# Patient Record
Sex: FEMALE | Race: NATIVE HAWAIIAN OR OTHER PACIFIC ISLANDER | NOT HISPANIC OR LATINO | Employment: FULL TIME | URBAN - METROPOLITAN AREA
[De-identification: names, ages, dates, MRNs, and addresses within clinical notes are randomized per-mention and may not be internally consistent; named-entity substitution may affect disease eponyms.]

---

## 2017-01-03 ENCOUNTER — OFFICE VISIT (OUTPATIENT)
Dept: NEUROLOGY | Facility: CLINIC | Age: 47
End: 2017-01-03

## 2017-01-03 VITALS
SYSTOLIC BLOOD PRESSURE: 150 MMHG | HEIGHT: 64 IN | WEIGHT: 146.4 LBS | DIASTOLIC BLOOD PRESSURE: 84 MMHG | BODY MASS INDEX: 25 KG/M2 | HEART RATE: 60 BPM

## 2017-03-02 DIAGNOSIS — E03.8 OTHER SPECIFIED HYPOTHYROIDISM: ICD-10-CM

## 2017-03-02 NOTE — TELEPHONE ENCOUNTER
Levothyroxine     Last Written Prescription Date: 08/03/16  Last Quantity: 30, # refills: 5  Last Office Visit with G, P or [facility] prescribing provider: 10/10/16        TSH   Date Value Ref Range Status   03/15/2016 0.44 0.40 - 4.00 mU/L Final

## 2017-03-07 RX ORDER — LEVOTHYROXINE SODIUM 100 UG/1
100 TABLET ORAL DAILY
Qty: 30 TABLET | Refills: 0 | Status: SHIPPED | OUTPATIENT
Start: 2017-03-07 | End: 2017-04-18

## 2017-03-07 NOTE — TELEPHONE ENCOUNTER
Medication is being filled for 1 time refill only due to:  Future labs ordered thyroid labs due.   Aida Candelario RN

## 2017-04-06 DIAGNOSIS — E03.8 OTHER SPECIFIED HYPOTHYROIDISM: ICD-10-CM

## 2017-04-06 NOTE — TELEPHONE ENCOUNTER
levothyroxine     Last Written Prescription Date: 3/7/17  Last Quantity: 30, # refills: 0  Last Office Visit with G, P or [facility] prescribing provider: 10/10/16        TSH   Date Value Ref Range Status   03/15/2016 0.44 0.40 - 4.00 mU/L Final

## 2017-04-18 ENCOUNTER — MYC MEDICAL ADVICE (OUTPATIENT)
Dept: INTERNAL MEDICINE | Facility: CLINIC | Age: 47
End: 2017-04-18

## 2017-04-18 ENCOUNTER — MYC MEDICAL ADVICE (OUTPATIENT)
Dept: ENDOCRINOLOGY | Facility: CLINIC | Age: 47
End: 2017-04-18

## 2017-04-18 DIAGNOSIS — E03.8 OTHER SPECIFIED HYPOTHYROIDISM: ICD-10-CM

## 2017-04-18 RX ORDER — LEVOTHYROXINE SODIUM 100 UG/1
TABLET ORAL
Qty: 30 TABLET | Refills: 0 | OUTPATIENT
Start: 2017-04-18

## 2017-04-18 RX ORDER — LEVOTHYROXINE SODIUM 100 UG/1
100 TABLET ORAL DAILY
Qty: 30 TABLET | Refills: 0 | Status: SHIPPED | OUTPATIENT
Start: 2017-04-18 | End: 2017-05-20

## 2017-04-18 NOTE — TELEPHONE ENCOUNTER
Please let the patient know that I was on maternity leave until April 17, so did not receive the message.    The refill may not have went through since her TSH test was overdue.

## 2017-04-18 NOTE — TELEPHONE ENCOUNTER
Pt calls, she has scheduled an appt.   Next 5 appointments (look out 90 days)     May 17, 2017  7:20 AM CDT   [facility]    [address]  [facility] [id number] [phone]                Medication is being filled for 1 time refill only due to:  Refilled through upcoming office visit.

## 2017-05-17 ENCOUNTER — OFFICE VISIT (OUTPATIENT)
Dept: INTERNAL MEDICINE | Facility: CLINIC | Age: 47
End: 2017-05-17
Payer: COMMERCIAL

## 2017-05-17 ENCOUNTER — HOSPITAL ENCOUNTER (OUTPATIENT)
Dept: MAMMOGRAPHY | Facility: CLINIC | Age: 47
Discharge: HOME | End: 2017-05-17
Attending: INTERNAL MEDICINE | Admitting: INTERNAL MEDICINE
Payer: COMMERCIAL

## 2017-05-17 VITALS
OXYGEN SATURATION: 100 % | SYSTOLIC BLOOD PRESSURE: 92 MMHG | WEIGHT: 137.4 LBS | TEMPERATURE: 97.7 F | BODY MASS INDEX: 23.46 KG/M2 | DIASTOLIC BLOOD PRESSURE: 68 MMHG | HEART RATE: 52 BPM | HEIGHT: 64 IN

## 2017-05-17 DIAGNOSIS — Z12.31 VISIT FOR SCREENING MAMMOGRAM: ICD-10-CM

## 2017-05-17 DIAGNOSIS — E03.8 OTHER SPECIFIED HYPOTHYROIDISM: Primary | ICD-10-CM

## 2017-05-17 LAB
T3 SERPL-MCNC: 67 NG/DL (ref 60–181)
T4 FREE SERPL-MCNC: 1.3 NG/DL (ref 0.76–1.46)
TSH SERPL DL<=0.05 MIU/L-ACNC: 0.46 MU/L (ref 0.4–4)

## 2017-05-17 PROCEDURE — 99213 OFFICE O/P EST LOW 20 MIN: CPT | Performed by: INTERNAL MEDICINE

## 2017-05-17 PROCEDURE — 36415 COLL VENOUS BLD VENIPUNCTURE: CPT | Performed by: INTERNAL MEDICINE

## 2017-05-17 PROCEDURE — G0202 SCR MAMMO BI INCL CAD: HCPCS

## 2017-05-17 PROCEDURE — 84439 ASSAY OF FREE THYROXINE: CPT | Performed by: INTERNAL MEDICINE

## 2017-05-17 PROCEDURE — 84443 ASSAY THYROID STIM HORMONE: CPT | Performed by: INTERNAL MEDICINE

## 2017-05-17 PROCEDURE — 84480 ASSAY TRIIODOTHYRONINE (T3): CPT | Performed by: INTERNAL MEDICINE

## 2017-05-17 NOTE — PROGRESS NOTES
"  SUBJECTIVE:                                                    Gale Willett is a 46 year old female who presents to clinic today for the following health issues:    Hypothyroidism Follow-up      Since last visit, patient describes the following symptoms: Weight stable, no hair loss, no skin changes, no constipation, no loose stools       Amount of exercise or physical activity: 2-3 days/week for an average of 30-60 Minutes    Problems taking medications regularly: No    Medication side effects: none    Diet: regular (no restrictions)      Problem list and histories reviewed & adjusted, as indicated.      Reviewed and updated as needed this visit by clinical staff       Reviewed and updated as needed this visit by Provider         ROS:  C: NEGATIVE for fever, chills, change in weight  E/M: NEGATIVE for ear, mouth and throat problems  R: NEGATIVE for significant cough or SOB  CV: NEGATIVE for chest pain, palpitations or peripheral edema    OBJECTIVE:                                                    BP 92/68 (BP Location: Left arm, Patient Position: Chair, Cuff Size: Adult Regular)  Pulse 52  Temp 97.7  F (36.5  C) (Oral)  Ht 5' 4\" (1.626 m)  Wt 137 lb 6.4 oz (62.3 kg)  LMP 05/14/2017  SpO2 100%  BMI 23.58 kg/m2  Body mass index is 23.58 kg/(m^2).  GENERAL: healthy, alert and no distress  NECK: no adenopathy, no asymmetry, masses, or scars and thyroid normal to palpation  RESP: lungs clear to auscultation - no rales, rhonchi or wheezes  CV: regular rate and rhythm, normal S1 S2, no S3 or S4, no murmur, click or rub     ASSESSMENT/PLAN:                                                        (E03.8) Other specified hypothyroidism  (primary encounter diagnosis)  Comment: reassess  Plan: TSH, T4 free, T3 total            (Z12.31) Visit for screening mammogram  Comment:   Plan: MA Screen with Implants Bilateral w/Carlos,                  Kassi Garcia MD  [facility]    "

## 2017-05-17 NOTE — NURSING NOTE
"Chief Complaint   Patient presents with     Recheck Medication     Thyroid - Levothyroxine       Initial BP 92/68 (BP Location: Left arm, Patient Position: Chair, Cuff Size: Adult Regular)  Pulse 52  Temp 97.7  F (36.5  C) (Oral)  Ht 5' 4\" (1.626 m)  Wt 137 lb 6.4 oz (62.3 kg)  LMP 05/14/2017  SpO2 100%  BMI 23.58 kg/m2 Estimated body mass index is 23.58 kg/(m^2) as calculated from the following:    Height as of this encounter: 5' 4\" (1.626 m).    Weight as of this encounter: 137 lb 6.4 oz (62.3 kg).  Medication Reconciliation: complete   Heena Mariee MA    "

## 2017-05-17 NOTE — MR AVS SNAPSHOT
"              After Visit Summary   5/17/2017    Gale Willett    MRN: [id number]           Patient Information     Date Of Birth          1970        Visit Information        Provider Department      5/17/2017 7:20 AM Kassi Garcia MD [facility]        Today's Diagnoses     Other specified hypothyroidism    -  1       Follow-ups after your visit        Who to contact     If you have questions or need follow up information about today's clinic visit or your schedule please contact [facility] directly at [phone].  Normal or non-critical lab and imaging results will be communicated to you by MyChart, letter or phone within 4 business days after the clinic has received the results. If you do not hear from us within 7 days, please contact the clinic through [facility] or phone. If you have a critical or abnormal lab result, we will notify you by phone as soon as possible.  Submit refill requests through [employer] or call your pharmacy and they will forward the refill request to us. Please allow 3 business days for your refill to be completed.          Additional Information About Your Visit        MyChart Information     [employer] gives you secure access to your electronic health record. If you see a primary care provider, you can also send messages to your care team and make appointments. If you have questions, please call your primary care clinic.  If you do not have a primary care provider, please call [phone] and they will assist you.        Care EveryWhere ID     This is your Care EveryWhere ID. This could be used by other organizations to access your [facility] medical records  [id number]        Your Vitals Were     Pulse Temperature Height Last Period Pulse Oximetry BMI (Body Mass Index)    52 97.7  F (36.5  C) (Oral) 5' 4\" (1.626 m) 05/14/2017 100% 23.58 kg/m2       Blood Pressure from Last 3 Encounters:   05/17/17 92/68   01/03/17 150/84   10/10/16 90/60    " Weight from Last 3 Encounters:   05/17/17 137 lb 6.4 oz (62.3 kg)   01/03/17 146 lb 6.4 oz (66.4 kg)   10/10/16 139 lb 11.2 oz (63.4 kg)              We Performed the Following     T3 total     T4 free     TSH        Primary Care Provider Office Phone # Fax #    Kassi Garcia [phone]       [facility] [address] [facility] [address]        Thank you!     Thank you for choosing [facility]  for your care. Our goal is always to provide you with excellent care. Hearing back from our patients is one way we can continue to improve our services. Please take a few minutes to complete the written survey that you may receive in the mail after your visit with us. Thank you!             Your Updated Medication List - Protect others around you: Learn how to safely use, store and throw away your medicines at www.disposemymeds.org.          This list is accurate as of: 5/17/17  8:06 AM.  Always use your most recent med list.                   Brand Name Dispense Instructions for use    levothyroxine 100 MCG tablet    SYNTHROID/LEVOTHROID    30 tablet    Take 1 tablet (100 mcg) by mouth daily

## 2017-05-20 DIAGNOSIS — E03.8 OTHER SPECIFIED HYPOTHYROIDISM: ICD-10-CM

## 2017-05-22 RX ORDER — LEVOTHYROXINE SODIUM 100 UG/1
TABLET ORAL
Qty: 90 TABLET | Refills: 1 | Status: SHIPPED | OUTPATIENT
Start: 2017-05-22 | End: 2017-11-08

## 2017-05-22 NOTE — TELEPHONE ENCOUNTER
Levothyroxine     Last Written Prescription Date: 4/18/17  Last Quantity: 30, # refills: 0  Last Office Visit with G, P or [facility] prescribing provider: 5/17/17        TSH   Date Value Ref Range Status   05/17/2017 0.46 0.40 - 4.00 mU/L Final

## 2017-11-08 DIAGNOSIS — E03.8 OTHER SPECIFIED HYPOTHYROIDISM: ICD-10-CM

## 2017-11-08 RX ORDER — LEVOTHYROXINE SODIUM 100 UG/1
TABLET ORAL
Qty: 90 TABLET | Refills: 1 | Status: SHIPPED | OUTPATIENT
Start: 2017-11-08 | End: 2018-05-02

## 2018-05-02 DIAGNOSIS — E03.8 OTHER SPECIFIED HYPOTHYROIDISM: ICD-10-CM

## 2018-05-02 RX ORDER — LEVOTHYROXINE SODIUM 100 UG/1
TABLET ORAL
Qty: 90 TABLET | Refills: 0 | Status: SHIPPED | OUTPATIENT
Start: 2018-05-02 | End: 2018-07-16

## 2018-05-02 NOTE — TELEPHONE ENCOUNTER
"          Requested Prescriptions   Pending Prescriptions Disp Refills     levothyroxine (SYNTHROID/LEVOTHROID) 100 MCG tablet [Pharmacy Med Name: LEVOTHYROXINE 100 MCG TABLET] 90 tablet 1     Sig: TAKE 1 TABLET BY MOUTH DAILY    Thyroid Protocol Passed    5/2/2018  1:40 AM       Passed - Patient is 12 years or older       Passed - Recent (12 mo) or future (30 days) visit within the authorizing provider's specialty    Patient had office visit in the last 12 months or has a visit in the next 30 days with authorizing provider or within the authorizing provider's specialty.  See \"Patient Info\" tab in inbasket, or \"Choose Columns\" in Meds & Orders section of the refill encounter.           Passed - Normal TSH on file in past 12 months    Recent Labs   Lab Test  05/17/17   0837   TSH  0.46             Passed - No active pregnancy on record    If patient is pregnant or has had a positive pregnancy test, please check TSH.         Passed - No positive pregnancy test in past 12 months    If patient is pregnant or has had a positive pregnancy test, please check TSH.            "

## 2018-07-16 ENCOUNTER — MYC REFILL (OUTPATIENT)
Dept: INTERNAL MEDICINE | Facility: CLINIC | Age: 48
End: 2018-07-16

## 2018-07-16 DIAGNOSIS — E03.8 OTHER SPECIFIED HYPOTHYROIDISM: ICD-10-CM

## 2018-07-17 RX ORDER — LEVOTHYROXINE SODIUM 100 UG/1
100 TABLET ORAL DAILY
Qty: 90 TABLET | Refills: 0 | Status: SHIPPED | OUTPATIENT
Start: 2018-07-17 | End: 2018-10-04

## 2018-07-17 NOTE — TELEPHONE ENCOUNTER
Message from Nivela:  Original authorizing provider: MD Anthony Macariorhianna DOTSONMireya Willett would like a refill of the following medications:  levothyroxine (SYNTHROID/LEVOTHROID) 100 MCG tablet [Kassi Garcia MD]    Preferred pharmacy: [address] [address] [facility]    Comment:  I sent Dr. Garcia a message. I didn't run out, but I left behind my prescription when I was traveling for work. Can you please refill today? I haven't taken my medication since 7/13. Thank you.

## 2018-07-17 NOTE — TELEPHONE ENCOUNTER
"Patient left Levothyroxine bottle at hotel and has been out for about 5 days. Requesting refill.     Requested Prescriptions   Pending Prescriptions Disp Refills     levothyroxine (SYNTHROID/LEVOTHROID) 100 MCG tablet  Last Written Prescription Date:  5/2/18  Last Fill Quantity: 90,  # refills: 0   Last office visit: 5/17/2017 with prescribing provider:  Jose   Future Office Visit:    90 tablet 0     Sig: Take 1 tablet (100 mcg) by mouth daily    Thyroid Protocol Failed    7/17/2018  3:46 PM       Failed - Recent (12 mo) or future (30 days) visit within the authorizing provider's specialty    Patient had office visit in the last 12 months or has a visit in the next 30 days with authorizing provider or within the authorizing provider's specialty.  See \"Patient Info\" tab in inbasket, or \"Choose Columns\" in Meds & Orders section of the refill encounter.           Failed - Normal TSH on file in past 12 months    Recent Labs   Lab Test  05/17/17   0837   TSH  0.46             Passed - Patient is 12 years or older       Passed - No active pregnancy on record    If patient is pregnant or has had a positive pregnancy test, please check TSH.         Passed - No positive pregnancy test in past 12 months    If patient is pregnant or has had a positive pregnancy test, please check TSH.        Medication is being filled for 1 time refill only due to:  Patient needs to be seen because it has been more than one year since last visit.   "

## 2018-08-01 DIAGNOSIS — E03.8 OTHER SPECIFIED HYPOTHYROIDISM: ICD-10-CM

## 2018-08-01 NOTE — TELEPHONE ENCOUNTER
"Requested Prescriptions   Pending Prescriptions Disp Refills     levothyroxine (SYNTHROID/LEVOTHROID) 100 MCG tablet [Pharmacy Med Name: LEVOTHYROXINE 100 MCG TABLET] 90 tablet 0    Last Written Prescription Date:  07/17/2018  Last Fill Quantity: 90,  # refills: 0   Last office visit: 5/17/2017 with prescribing provider:     Future Office Visit:   Sig: TAKE 1 TABLET BY MOUTH EVERY DAY    Thyroid Protocol Failed    8/1/2018  1:50 AM       Failed - Recent (12 mo) or future (30 days) visit within the authorizing provider's specialty    Patient had office visit in the last 12 months or has a visit in the next 30 days with authorizing provider or within the authorizing provider's specialty.  See \"Patient Info\" tab in inbasket, or \"Choose Columns\" in Meds & Orders section of the refill encounter.           Failed - Normal TSH on file in past 12 months    Recent Labs   Lab Test  05/17/17   0837   TSH  0.46             Passed - Patient is 12 years or older       Passed - No active pregnancy on record    If patient is pregnant or has had a positive pregnancy test, please check TSH.         Passed - No positive pregnancy test in past 12 months    If patient is pregnant or has had a positive pregnancy test, please check TSH.          "

## 2018-08-03 NOTE — TELEPHONE ENCOUNTER
Last office visit 5-17-17    Patient is over-due for appointment.  [facility] message sent 7-17-18 to inform her.  No future appointment scheduled.    Will refill x 1 month after appointment scheduled.    Attempted to speak with Patient but no answer and not able to leave message d/t mailbox full.    Will try again later.

## 2018-08-09 RX ORDER — LEVOTHYROXINE SODIUM 100 UG/1
TABLET ORAL
Qty: 30 TABLET | Refills: 0 | OUTPATIENT
Start: 2018-08-09

## 2018-08-09 NOTE — TELEPHONE ENCOUNTER
Patient received refill 5/2/18 for 90 tabs and 7/17/18 for 90 tabs as she was out of town and left her medications there. Due for office visit and labs. Was sent [employer] on 7/16 and patient did read it. No appointment scheduled. Patient should not be out of medication yet. Denied.

## 2018-08-10 ENCOUNTER — TELEPHONE (OUTPATIENT)
Dept: INTERNAL MEDICINE | Facility: CLINIC | Age: 48
End: 2018-08-10

## 2018-08-10 NOTE — TELEPHONE ENCOUNTER
Received refill request from [facility] for Levothyroxine - denial was sent on 8/9/18 as this is too soon to refill. Contacted the pharmacy, they show patient picked up prescription sent on 7/17/18 for 90 day supply.

## 2018-10-04 ENCOUNTER — TELEPHONE (OUTPATIENT)
Dept: INTERNAL MEDICINE | Facility: CLINIC | Age: 48
End: 2018-10-04

## 2018-10-04 DIAGNOSIS — E03.8 OTHER SPECIFIED HYPOTHYROIDISM: ICD-10-CM

## 2018-10-05 NOTE — TELEPHONE ENCOUNTER
"Requested Prescriptions   Pending Prescriptions Disp Refills     levothyroxine (SYNTHROID/LEVOTHROID) 100 MCG tablet [Pharmacy Med Name: LEVOTHYROXINE 0.100MG (100MCG) TAB] 90 tablet 0    Last Written Prescription Date:  07/17/2018  Last Fill Quantity: 90,  # refills: 0   Last office visit: 5/17/2017 with prescribing provider:     Future Office Visit:   Sig: TAKE 1 TABLET BY MOUTH EVERY DAY    Thyroid Protocol Failed    10/5/2018  8:39 AM       Failed - Recent (12 mo) or future (30 days) visit within the authorizing provider's specialty    Patient had office visit in the last 12 months or has a visit in the next 30 days with authorizing provider or within the authorizing provider's specialty.  See \"Patient Info\" tab in inbasket, or \"Choose Columns\" in Meds & Orders section of the refill encounter.           Failed - Normal TSH on file in past 12 months    Recent Labs   Lab Test  05/17/17   0837   TSH  0.46             Passed - Patient is 12 years or older       Passed - No active pregnancy on record    If patient is pregnant or has had a positive pregnancy test, please check TSH.         Passed - No positive pregnancy test in past 12 months    If patient is pregnant or has had a positive pregnancy test, please check TSH.          "

## 2018-10-08 RX ORDER — LEVOTHYROXINE SODIUM 100 UG/1
TABLET ORAL
Qty: 90 TABLET | Refills: 0 | Status: SHIPPED | OUTPATIENT
Start: 2018-10-08 | End: 2018-10-22

## 2018-10-22 ENCOUNTER — OFFICE VISIT (OUTPATIENT)
Dept: INTERNAL MEDICINE | Facility: CLINIC | Age: 48
End: 2018-10-22
Payer: COMMERCIAL

## 2018-10-22 VITALS
DIASTOLIC BLOOD PRESSURE: 72 MMHG | SYSTOLIC BLOOD PRESSURE: 112 MMHG | BODY MASS INDEX: 24.07 KG/M2 | OXYGEN SATURATION: 100 % | HEIGHT: 64 IN | WEIGHT: 141 LBS | TEMPERATURE: 98.2 F | HEART RATE: 58 BPM

## 2018-10-22 DIAGNOSIS — Z12.31 VISIT FOR SCREENING MAMMOGRAM: ICD-10-CM

## 2018-10-22 DIAGNOSIS — Z00.00 ENCOUNTER FOR ROUTINE ADULT HEALTH EXAMINATION WITHOUT ABNORMAL FINDINGS: Primary | ICD-10-CM

## 2018-10-22 DIAGNOSIS — Z98.82 H/O BREAST IMPLANT: ICD-10-CM

## 2018-10-22 DIAGNOSIS — E03.8 OTHER SPECIFIED HYPOTHYROIDISM: ICD-10-CM

## 2018-10-22 DIAGNOSIS — N64.4 BREAST PAIN, LEFT: ICD-10-CM

## 2018-10-22 LAB
BASOPHILS # BLD AUTO: 0 10E9/L (ref 0–0.2)
BASOPHILS NFR BLD AUTO: 0 %
DIFFERENTIAL METHOD BLD: ABNORMAL
EOSINOPHIL # BLD AUTO: 0.2 10E9/L (ref 0–0.7)
EOSINOPHIL NFR BLD AUTO: 5.3 %
ERYTHROCYTE [DISTWIDTH] IN BLOOD BY AUTOMATED COUNT: 12.9 % (ref 10–15)
HCT VFR BLD AUTO: 39.6 % (ref 35–47)
HGB BLD-MCNC: 13.2 G/DL (ref 11.7–15.7)
LYMPHOCYTES # BLD AUTO: 1.1 10E9/L (ref 0.8–5.3)
LYMPHOCYTES NFR BLD AUTO: 35 %
MCH RBC QN AUTO: 31.3 PG (ref 26.5–33)
MCHC RBC AUTO-ENTMCNC: 33.3 G/DL (ref 31.5–36.5)
MCV RBC AUTO: 94 FL (ref 78–100)
MONOCYTES # BLD AUTO: 0.3 10E9/L (ref 0–1.3)
MONOCYTES NFR BLD AUTO: 10.2 %
NEUTROPHILS # BLD AUTO: 1.6 10E9/L (ref 1.6–8.3)
NEUTROPHILS NFR BLD AUTO: 49.5 %
PLATELET # BLD AUTO: 175 10E9/L (ref 150–450)
RBC # BLD AUTO: 4.22 10E12/L (ref 3.8–5.2)
T3 SERPL-MCNC: 82 NG/DL (ref 60–181)
WBC # BLD AUTO: 3.2 10E9/L (ref 4–11)

## 2018-10-22 PROCEDURE — 84439 ASSAY OF FREE THYROXINE: CPT | Performed by: INTERNAL MEDICINE

## 2018-10-22 PROCEDURE — 36415 COLL VENOUS BLD VENIPUNCTURE: CPT | Performed by: INTERNAL MEDICINE

## 2018-10-22 PROCEDURE — 80053 COMPREHEN METABOLIC PANEL: CPT | Performed by: INTERNAL MEDICINE

## 2018-10-22 PROCEDURE — 85025 COMPLETE CBC W/AUTO DIFF WBC: CPT | Performed by: INTERNAL MEDICINE

## 2018-10-22 PROCEDURE — 84443 ASSAY THYROID STIM HORMONE: CPT | Performed by: INTERNAL MEDICINE

## 2018-10-22 PROCEDURE — 84480 ASSAY TRIIODOTHYRONINE (T3): CPT | Performed by: INTERNAL MEDICINE

## 2018-10-22 PROCEDURE — 99396 PREV VISIT EST AGE 40-64: CPT | Performed by: INTERNAL MEDICINE

## 2018-10-22 RX ORDER — LEVOTHYROXINE SODIUM 100 UG/1
100 TABLET ORAL DAILY
Qty: 90 TABLET | Refills: 4 | Status: SHIPPED | OUTPATIENT
Start: 2018-10-22 | End: 2020-02-11

## 2018-10-22 ASSESSMENT — ENCOUNTER SYMPTOMS
HEADACHES: 0
FEVER: 0
PARESTHESIAS: 0
HEMATURIA: 0
ARTHRALGIAS: 0
DIZZINESS: 0
DIARRHEA: 0
HEMATOCHEZIA: 0
ABDOMINAL PAIN: 0
BREAST MASS: 0
EYE PAIN: 0
MYALGIAS: 0
SHORTNESS OF BREATH: 0
SORE THROAT: 0
COUGH: 0
JOINT SWELLING: 0
DYSURIA: 0
PALPITATIONS: 0
NAUSEA: 0
WEAKNESS: 0
CHILLS: 0
HEARTBURN: 0
NERVOUS/ANXIOUS: 0
CONSTIPATION: 0
FREQUENCY: 0

## 2018-10-22 NOTE — MR AVS SNAPSHOT
After Visit Summary   10/22/2018    Gale Willett    MRN: [id number]           Patient Information     Date Of Birth          1970        Visit Information        Provider Department      10/22/2018 8:20 AM Kassi Garcia MD [facility]        Today's Diagnoses     Encounter for routine adult health examination without abnormal findings    -  1    Other specified hypothyroidism        Visit for screening mammogram        H/O breast implant        Breast pain, left          Care Instructions      Preventive Health Recommendations  Female Ages 40 to 49    Yearly exam:     See your health care provider every year in order to  1. Review health changes.   2. Discuss preventive care.    3. Review your medicines if your doctor prescribed any.      Get a Pap test every three years (unless you have an abnormal result and your provider advises testing more often).      If you get Pap tests with HPV test, you only need to test every 5 years, unless you have an abnormal result. You do not need a Pap test if your uterus was removed (hysterectomy) and you have not had cancer.      You should be tested each year for STDs (sexually transmitted diseases), if you're at risk.     Ask your doctor if you should have a mammogram.      Have a colonoscopy (test for colon cancer) if someone in your family has had colon cancer or polyps before age 50.       Have a cholesterol test every 5 years.       Have a diabetes test (fasting glucose) after age 45. If you are at risk for diabetes, you should have this test every 3 years.    Shots: Get a flu shot each year. Get a tetanus shot every 10 years.     Nutrition:     Eat at least 5 servings of fruits and vegetables each day.    Eat whole-grain bread, whole-wheat pasta and brown rice instead of white grains and rice.    Get adequate Calcium and Vitamin D.      Lifestyle    Exercise at least 150 minutes a week (an average of 30 minutes a day, 5 days a  week). This will help you control your weight and prevent disease.    Limit alcohol to one drink per day.    No smoking.     Wear sunscreen to prevent skin cancer.    See your dentist every six months for an exam and cleaning.          Follow-ups after your visit        Additional Services     PLASTIC SURGERY REFERRAL       Your provider has referred you to: [facility] [place] [phone]   [email or web address]    Please be aware that coverage of these services is subject to the terms and limitations of your health insurance plan.  Call member services at your health plan with any benefit or coverage questions.      Please bring the following with you to your appointment:    (1) Any X-Rays, CTs or MRIs which have been performed.  Contact the facility where they were done to arrange for  prior to your scheduled appointment.    (2) List of current medications  (3) This referral request   (4) Any documents/labs given to you for this referral                  Future tests that were ordered for you today     Open Future Orders        Priority Expected Expires Ordered    US Breast Left Limited 1-3 Quadrants Routine  10/22/2019 10/22/2018    MA Diagnostic Digital Bilateral Routine  10/22/2019 10/22/2018            Who to contact     If you have questions or need follow up information about today's clinic visit or your schedule please contact [facility] directly at [phone].  Normal or non-critical lab and imaging results will be communicated to you by MyChart, letter or phone within 4 business days after the clinic has received the results. If you do not hear from us within 7 days, please contact the clinic through MyChart or phone. If you have a critical or abnormal lab result, we will notify you by phone as soon as possible.  Submit refill requests through [employer] or call your pharmacy and they will forward the refill request to us. Please allow 3 business days for  "your refill to be completed.          Additional Information About Your Visit        MyChart Information     [employer] gives you secure access to your electronic health record. If you see a primary care provider, you can also send messages to your care team and make appointments. If you have questions, please call your primary care clinic.  If you do not have a primary care provider, please call [phone] and they will assist you.        Care EveryWhere ID     This is your Care EveryWhere ID. This could be used by other organizations to access your [facility] medical records  [id number]        Your Vitals Were     Pulse Temperature Height Last Period Pulse Oximetry Breastfeeding?    58 98.2  F (36.8  C) (Oral) 5' 4\" (1.626 m) 10/20/2018 100% No    BMI (Body Mass Index)                   24.2 kg/m2            Blood Pressure from Last 3 Encounters:   10/22/18 112/72   05/17/17 92/68   01/03/17 150/84    Weight from Last 3 Encounters:   10/22/18 141 lb (64 kg)   05/17/17 137 lb 6.4 oz (62.3 kg)   01/03/17 146 lb 6.4 oz (66.4 kg)              We Performed the Following     CBC with platelets differential     Comprehensive metabolic panel     PLASTIC SURGERY REFERRAL     T3 total     T4 free     TSH        Primary Care Provider Office Phone # Fax #    Kassi Mauricio Garcia [phone]       [address] [facility] [address]        Equal Access to Services     [facility]: Hadii aad ku hadasho Soomaali, waaxda luqadaha, qaybta kaalmada adeangelicayada, waxay griffin [place] . So [facility] [phone].    ATENCIÓN: Si habla español, tiene a raphael disposición servicios gratuitos de asistencia lingüística. Llame al [phone].    We comply with applicable federal civil rights laws and [place] laws. We do not discriminate on the basis of race, color, national origin, age, disability, sex, sexual orientation, or gender identity.            Thank you!     Thank you for choosing [facility]  for your care. Our goal is always to provide you with excellent care. Hearing back from our patients is one way we can continue to improve our services. Please take a few minutes to complete the written survey that you may receive in the mail after your visit with us. Thank you!             Your Updated Medication List - Protect others around you: Learn how to safely use, store and throw away your medicines at www.disposemymeds.org.          This list is accurate as of 10/22/18  9:08 AM.  Always use your most recent med list.                   Brand Name Dispense Instructions for use Diagnosis    levothyroxine 100 MCG tablet    SYNTHROID/LEVOTHROID    90 tablet    TAKE 1 TABLET BY MOUTH EVERY DAY    Other specified hypothyroidism

## 2018-10-22 NOTE — NURSING NOTE
"/72  Pulse 58  Temp 98.2  F (36.8  C) (Oral)  Ht 5' 4\" (1.626 m)  Wt 141 lb (64 kg)  LMP 10/20/2018  SpO2 100%  Breastfeeding? No  BMI 24.2 kg/m2    "

## 2018-10-22 NOTE — PROGRESS NOTES
SUBJECTIVE:   CC: Gale Willett is an 48 year old woman who presents for preventive health visit.     Physical   Annual:     Getting at least 3 servings of Calcium per day:  Yes    Bi-annual eye exam:  Yes    Dental care twice a year:  Yes    Sleep apnea or symptoms of sleep apnea:  None    Diet:  Regular (no restrictions)    Frequency of exercise:  2-3 days/week    Duration of exercise:  45-60 minutes    Taking medications regularly:  Yes    Medication side effects:  None    Additional concerns today:  YES        Today's PHQ-2 Score:   PHQ-2 ( 1999 [employer]) 10/22/2018   Q1: Little interest or pleasure in doing things 0   Q2: Feeling down, depressed or hopeless 0   PHQ-2 Score 0   Q1: Little interest or pleasure in doing things Not at all   Q2: Feeling down, depressed or hopeless Not at all   PHQ-2 Score 0       Abuse: Current or Past(Physical, Sexual or Emotional)- No  Do you feel safe in your environment - Yes    Social History   Substance Use Topics     Smoking status: Never Smoker     Smokeless tobacco: Never Used     Alcohol use 0.0 oz/week     0 Standard drinks or equivalent per week      Comment: every other weekend     Alcohol Use 10/22/2018   If you drink alcohol do you typically have greater than 3 drinks per day OR greater than 7 drinks per week? No       Reviewed orders with patient.  Reviewed health maintenance and updated orders accordingly - Yes  Labs reviewed in EPIC    Regular mammograms    Pertinent mammograms are reviewed under the imaging tab.  History of abnormal Pap smear: NO - age 30- 65 PAP every 3 years recommended  PAP / HPV Latest Ref Rng & Units 2/15/2016 10/23/2014   PAP - NIL NIL   HPV 16 DNA NEG Negative -   HPV 18 DNA NEG Negative -   OTHER HR HPV NEG Negative -     Reviewed and updated as needed this visit by clinical staff  Tobacco  Allergies  Meds  Med Hx  Surg Hx  Fam Hx  Soc Hx        Reviewed and updated as needed this visit by Provider  Allergies  Meds       "      Review of Systems   Constitutional: Negative for chills and fever.   HENT: Negative for congestion, ear pain, hearing loss and sore throat.    Eyes: Positive for visual disturbance. Negative for pain.   Respiratory: Negative for cough and shortness of breath.    Cardiovascular: Negative for chest pain, palpitations and peripheral edema.   Gastrointestinal: Negative for abdominal pain, constipation, diarrhea, heartburn, hematochezia and nausea.   Breasts:  Negative for tenderness, breast mass and discharge.   Genitourinary: Negative for dysuria, frequency, genital sores, hematuria, pelvic pain, urgency, vaginal bleeding and vaginal discharge.   Musculoskeletal: Negative for arthralgias, joint swelling and myalgias.   Skin: Negative for rash.   Neurological: Negative for dizziness, weakness, headaches and paresthesias.   Psychiatric/Behavioral: Negative for mood changes. The patient is not nervous/anxious.         OBJECTIVE:   /72  Pulse 58  Temp 98.2  F (36.8  C) (Oral)  Ht 5' 4\" (1.626 m)  Wt 141 lb (64 kg)  LMP 10/20/2018  SpO2 100%  Breastfeeding? No  BMI 24.2 kg/m2   /72  Pulse 58  Temp 98.2  F (36.8  C) (Oral)  Ht 5' 4\" (1.626 m)  Wt 141 lb (64 kg)  LMP 10/20/2018  SpO2 100%  Breastfeeding? No  BMI 24.2 kg/m2    Physical Exam  GENERAL: healthy, alert and no distress  EYES: Eyes grossly normal to inspection, PERRL and conjunctivae and sclerae normal  HENT: ear canals and TM's normal, nose and mouth without ulcers or lesions  NECK: no adenopathy, no asymmetry, masses, or scars and thyroid normal to palpation  RESP: lungs clear to auscultation - no rales, rhonchi or wheezes  BREAST: normal without masses, tenderness or nipple discharge and no palpable axillary masses or adenopathy  CV: regular rate and rhythm, normal S1 S2, no S3 or S4, no murmur, click or rub, no peripheral edema and peripheral pulses strong  ABDOMEN: soft, nontender, no hepatosplenomegaly, no masses and bowel " "sounds normal  : declines pap smear, as she is currently menstruating  MS: no gross musculoskeletal defects noted, no edema  SKIN: no suspicious lesions or rashes  NEURO: Normal strength and tone, mentation intact and speech normal  PSYCH: mentation appears normal, affect normal/bright        ASSESSMENT/PLAN:       ICD-10-CM    1. Encounter for routine adult health examination without abnormal findings Z00.00 Comprehensive metabolic panel     CBC with platelets differential     TSH     T4 free     T3 total   2. Other specified hypothyroidism E03.8 levothyroxine (SYNTHROID/LEVOTHROID) 100 MCG tablet   3. Visit for screening mammogram Z12.31 CANCELED: *MA Screening Digital Bilateral   4. H/O breast implant Z98.82 PLASTIC SURGERY REFERRAL   5. Breast pain, left N64.4 US Breast Left Limited 1-3 Quadrants     MA Diagnostic Digital Bilateral       COUNSELING:  Reviewed preventive health counseling, as reflected in patient instructions    BP Readings from Last 1 Encounters:   10/22/18 112/72     Estimated body mass index is 24.2 kg/(m^2) as calculated from the following:    Height as of this encounter: 5' 4\" (1.626 m).    Weight as of this encounter: 141 lb (64 kg).           reports that she has never smoked. She has never used smokeless tobacco.      Counseling Resources:  ATP IV Guidelines  Pooled Cohorts Equation Calculator  Breast Cancer Risk Calculator  FRAX Risk Assessment  ICSI Preventive Guidelines  Dietary Guidelines for Americans, 2010  USDA's MyPlate  ASA Prophylaxis  Lung CA Screening    Kassi Garcia MD  [facility]  Answers for HPI/ROS submitted by the patient on 10/22/2018   PHQ-2 Score: 0    "

## 2018-10-23 LAB
ALBUMIN SERPL-MCNC: 3.7 G/DL (ref 3.4–5)
ALP SERPL-CCNC: 44 U/L (ref 40–150)
ALT SERPL W P-5'-P-CCNC: 20 U/L (ref 0–50)
ANION GAP SERPL CALCULATED.3IONS-SCNC: 8 MMOL/L (ref 3–14)
AST SERPL W P-5'-P-CCNC: 22 U/L (ref 0–45)
BILIRUB SERPL-MCNC: 0.6 MG/DL (ref 0.2–1.3)
BUN SERPL-MCNC: 13 MG/DL (ref 7–30)
CALCIUM SERPL-MCNC: 8.7 MG/DL (ref 8.5–10.1)
CHLORIDE SERPL-SCNC: 106 MMOL/L (ref 94–109)
CO2 SERPL-SCNC: 25 MMOL/L (ref 20–32)
CREAT SERPL-MCNC: 0.8 MG/DL (ref 0.52–1.04)
GFR SERPL CREATININE-BSD FRML MDRD: 76 ML/MIN/1.7M2
GLUCOSE SERPL-MCNC: 89 MG/DL (ref 70–99)
POTASSIUM SERPL-SCNC: 4.7 MMOL/L (ref 3.4–5.3)
PROT SERPL-MCNC: 7.6 G/DL (ref 6.8–8.8)
SODIUM SERPL-SCNC: 139 MMOL/L (ref 133–144)
T4 FREE SERPL-MCNC: 1.41 NG/DL (ref 0.76–1.46)
TSH SERPL DL<=0.005 MIU/L-ACNC: 0.74 MU/L (ref 0.4–4)

## 2018-10-30 ENCOUNTER — HOSPITAL ENCOUNTER (OUTPATIENT)
Dept: ULTRASOUND IMAGING | Facility: CLINIC | Age: 48
End: 2018-10-30
Attending: INTERNAL MEDICINE
Payer: COMMERCIAL

## 2018-10-30 ENCOUNTER — HOSPITAL ENCOUNTER (OUTPATIENT)
Dept: MAMMOGRAPHY | Facility: CLINIC | Age: 48
Discharge: HOME | End: 2018-10-30
Attending: INTERNAL MEDICINE | Admitting: INTERNAL MEDICINE
Payer: COMMERCIAL

## 2018-10-30 DIAGNOSIS — N64.4 BREAST PAIN, LEFT: ICD-10-CM

## 2018-10-30 PROCEDURE — 76642 ULTRASOUND BREAST LIMITED: CPT | Mod: LT

## 2018-10-30 PROCEDURE — 77066 DX MAMMO INCL CAD BI: CPT

## 2019-04-19 ENCOUNTER — HEALTH MAINTENANCE LETTER (OUTPATIENT)
Age: 49
End: 2019-04-19

## 2020-02-09 ENCOUNTER — MYC REFILL (OUTPATIENT)
Dept: INTERNAL MEDICINE | Facility: CLINIC | Age: 50
End: 2020-02-09

## 2020-02-09 DIAGNOSIS — E03.8 OTHER SPECIFIED HYPOTHYROIDISM: ICD-10-CM

## 2020-02-09 RX ORDER — LEVOTHYROXINE SODIUM 100 UG/1
100 TABLET ORAL DAILY
Qty: 90 TABLET | Refills: 4 | Status: CANCELLED | OUTPATIENT
Start: 2020-02-09

## 2020-02-11 DIAGNOSIS — E03.8 OTHER SPECIFIED HYPOTHYROIDISM: ICD-10-CM

## 2020-02-11 RX ORDER — LEVOTHYROXINE SODIUM 100 UG/1
TABLET ORAL
Qty: 90 TABLET | Refills: 0 | Status: SHIPPED | OUTPATIENT
Start: 2020-02-11 | End: 2020-05-12

## 2020-02-11 NOTE — TELEPHONE ENCOUNTER
Duplicate request.  See refill request 2/11/20 (medication was refilled x 1 2/11/20 d/t future appointment scheduled).  Patient informed via Service Routet.    Next 5 appointments (look out 90 days)    Feb 26, 2020  8:00 AM CST  PHYSICAL with Kassi Garcia MD  [facility] [address]  [facility] [id number] [phone]

## 2020-02-11 NOTE — TELEPHONE ENCOUNTER
"Requested Prescriptions   Pending Prescriptions Disp Refills     levothyroxine (SYNTHROID/LEVOTHROID) 100 MCG tablet [Pharmacy Med Name:   LEVOTHYROXINE 0.100MG (100MCG) TAB]    Last Written Prescription Date:  10/22/18  Last Fill Quantity: 90,  # refills: 4   Last office visit: 10/22/2018 with prescribing provider:  Jose   Future Office Visit:   Next 5 appointments (look out 90 days)    Feb 26, 2020  8:00 AM CST  PHYSICAL with Kassi Garcia MD  [facility] [address]  [facility] [address]  [phone]          90 tablet 4     Sig: TAKE 1 TABLET BY MOUTH DAILY       Thyroid Protocol Failed - 2/11/2020 11:18 AM        Failed - Normal TSH on file in past 12 months     Recent Labs   Lab Test 10/22/18  0913   TSH 0.74              Passed - Patient is 12 years or older        Passed - Recent (12 mo) or future (30 days) visit within the authorizing provider's specialty     Patient has had an office visit with the authorizing provider or a provider within the authorizing providers department within the previous 12 mos or has a future within next 30 days. See \"Patient Info\" tab in inbasket, or \"Choose Columns\" in Meds & Orders section of the refill encounter.              Passed - Medication is active on med list        Passed - No active pregnancy on record     If patient is pregnant or has had a positive pregnancy test, please check TSH.          Passed - No positive pregnancy test in past 12 months     If patient is pregnant or has had a positive pregnancy test, please check TSH.             "

## 2020-03-07 DIAGNOSIS — Z12.31 VISIT FOR SCREENING MAMMOGRAM: ICD-10-CM

## 2020-03-07 PROCEDURE — 77067 SCR MAMMO BI INCL CAD: CPT | Mod: TC

## 2020-03-07 PROCEDURE — 77063 BREAST TOMOSYNTHESIS BI: CPT | Mod: TC

## 2020-03-10 ENCOUNTER — RESULT FOLLOW UP (OUTPATIENT)
Dept: INTERNAL MEDICINE | Facility: CLINIC | Age: 50
End: 2020-03-10

## 2020-03-10 ENCOUNTER — OFFICE VISIT (OUTPATIENT)
Dept: INTERNAL MEDICINE | Facility: CLINIC | Age: 50
End: 2020-03-10
Payer: COMMERCIAL

## 2020-03-10 ENCOUNTER — HEALTH MAINTENANCE LETTER (OUTPATIENT)
Age: 50
End: 2020-03-10

## 2020-03-10 VITALS
HEIGHT: 64 IN | BODY MASS INDEX: 23.9 KG/M2 | HEART RATE: 69 BPM | TEMPERATURE: 98.6 F | RESPIRATION RATE: 12 BRPM | WEIGHT: 140 LBS | OXYGEN SATURATION: 97 % | DIASTOLIC BLOOD PRESSURE: 60 MMHG | SYSTOLIC BLOOD PRESSURE: 120 MMHG

## 2020-03-10 DIAGNOSIS — N92.0 MENORRHAGIA WITH REGULAR CYCLE: ICD-10-CM

## 2020-03-10 DIAGNOSIS — R87.612 PAPANICOLAOU SMEAR OF CERVIX WITH LOW GRADE SQUAMOUS INTRAEPITHELIAL LESION (LGSIL): ICD-10-CM

## 2020-03-10 DIAGNOSIS — Z00.00 ROUTINE GENERAL MEDICAL EXAMINATION AT A HEALTH CARE FACILITY: Primary | ICD-10-CM

## 2020-03-10 LAB
BASOPHILS # BLD AUTO: 0 10E9/L (ref 0–0.2)
BASOPHILS NFR BLD AUTO: 0 %
DIFFERENTIAL METHOD BLD: ABNORMAL
EOSINOPHIL # BLD AUTO: 0.1 10E9/L (ref 0–0.7)
EOSINOPHIL NFR BLD AUTO: 2.1 %
ERYTHROCYTE [DISTWIDTH] IN BLOOD BY AUTOMATED COUNT: 13.2 % (ref 10–15)
HCT VFR BLD AUTO: 40.3 % (ref 35–47)
HGB BLD-MCNC: 13.2 G/DL (ref 11.7–15.7)
LYMPHOCYTES # BLD AUTO: 1.1 10E9/L (ref 0.8–5.3)
LYMPHOCYTES NFR BLD AUTO: 27.5 %
MCH RBC QN AUTO: 29.5 PG (ref 26.5–33)
MCHC RBC AUTO-ENTMCNC: 32.8 G/DL (ref 31.5–36.5)
MCV RBC AUTO: 90 FL (ref 78–100)
MONOCYTES # BLD AUTO: 0.4 10E9/L (ref 0–1.3)
MONOCYTES NFR BLD AUTO: 11.3 %
NEUTROPHILS # BLD AUTO: 2.3 10E9/L (ref 1.6–8.3)
NEUTROPHILS NFR BLD AUTO: 59.1 %
PLATELET # BLD AUTO: 210 10E9/L (ref 150–450)
RBC # BLD AUTO: 4.48 10E12/L (ref 3.8–5.2)
WBC # BLD AUTO: 3.9 10E9/L (ref 4–11)

## 2020-03-10 PROCEDURE — G0124 SCREEN C/V THIN LAYER BY MD: HCPCS | Performed by: INTERNAL MEDICINE

## 2020-03-10 PROCEDURE — 87624 HPV HI-RISK TYP POOLED RSLT: CPT | Performed by: INTERNAL MEDICINE

## 2020-03-10 PROCEDURE — 80061 LIPID PANEL: CPT | Performed by: INTERNAL MEDICINE

## 2020-03-10 PROCEDURE — 36415 COLL VENOUS BLD VENIPUNCTURE: CPT | Performed by: INTERNAL MEDICINE

## 2020-03-10 PROCEDURE — 80050 GENERAL HEALTH PANEL: CPT | Performed by: INTERNAL MEDICINE

## 2020-03-10 PROCEDURE — G0145 SCR C/V CYTO,THINLAYER,RESCR: HCPCS | Performed by: INTERNAL MEDICINE

## 2020-03-10 PROCEDURE — 99396 PREV VISIT EST AGE 40-64: CPT | Performed by: INTERNAL MEDICINE

## 2020-03-10 PROCEDURE — 84439 ASSAY OF FREE THYROXINE: CPT | Performed by: INTERNAL MEDICINE

## 2020-03-10 ASSESSMENT — ENCOUNTER SYMPTOMS
MYALGIAS: 0
DYSURIA: 0
HEMATOCHEZIA: 0
CHILLS: 0
SORE THROAT: 0
NERVOUS/ANXIOUS: 0
PALPITATIONS: 0
NAUSEA: 0
HEMATURIA: 0
SHORTNESS OF BREATH: 0
DIZZINESS: 0
JOINT SWELLING: 0
BREAST MASS: 0
DIARRHEA: 0
HEADACHES: 0
ABDOMINAL PAIN: 0
FREQUENCY: 0
FEVER: 0
CONSTIPATION: 0
WEAKNESS: 0
EYE PAIN: 0
COUGH: 0
HEARTBURN: 0
PARESTHESIAS: 0
ARTHRALGIAS: 0

## 2020-03-10 ASSESSMENT — MIFFLIN-ST. JEOR: SCORE: 1240.04

## 2020-03-10 NOTE — NURSING NOTE
"/60   Pulse 69   Temp 98.6  F (37  C) (Oral)   Resp 12   Ht 1.626 m (5' 4\")   Wt 63.5 kg (140 lb)   LMP 02/08/2020   SpO2 97%   Breastfeeding No   BMI 24.03 kg/m      "

## 2020-03-10 NOTE — PROGRESS NOTES
SUBJECTIVE:   CC: Gale Willett is an 49 year old woman who presents for preventive health visit.     Healthy Habits:     Getting at least 3 servings of Calcium per day:  NO    Bi-annual eye exam:  Yes    Dental care twice a year:  Yes    Sleep apnea or symptoms of sleep apnea:  None    Diet:  Regular (no restrictions)    Frequency of exercise:  2-3 days/week    Duration of exercise:  30-45 minutes    Taking medications regularly:  Yes    Medication side effects:  None    PHQ-2 Total Score: 0    Additional concerns today:  No      Today's PHQ-2 Score:   PHQ-2 ( 1999 Pfizer) 3/10/2020   Q1: Little interest or pleasure in doing things 0   Q2: Feeling down, depressed or hopeless 0   PHQ-2 Score 0   Q1: Little interest or pleasure in doing things Not at all   Q2: Feeling down, depressed or hopeless Not at all   PHQ-2 Score 0       Abuse: Current or Past(Physical, Sexual or Emotional)- No  Do you feel safe in your environment? Yes        Social History     Tobacco Use     Smoking status: Never Smoker     Smokeless tobacco: Never Used   Substance Use Topics     Alcohol use: Yes     Alcohol/week: 0.0 standard drinks     Comment: 1-2 times per week (one glass of wine)     If you drink alcohol do you typically have >3 drinks per day or >7 drinks per week? No    Alcohol Use 3/10/2020   Prescreen: >3 drinks/day or >7 drinks/week? No   Prescreen: >3 drinks/day or >7 drinks/week? -       Reviewed orders with patient.  Reviewed health maintenance and updated orders accordingly - Yes      Mammogram Screening: Patient over age 50, mutual decision to screen reflected in health maintenance.    Pertinent mammograms are reviewed under the imaging tab.  History of abnormal Pap smear: NO - age 30- 65 PAP every 3 years recommended  PAP / HPV Latest Ref Rng & Units 2/15/2016 10/23/2014   PAP - NIL NIL   HPV 16 DNA NEG Negative -   HPV 18 DNA NEG Negative -   OTHER HR HPV NEG Negative -     Reviewed and updated as needed this visit by  "clinical staff  Tobacco  Allergies  Meds  Med Hx  Surg Hx  Fam Hx  Soc Hx        Reviewed and updated as needed this visit by Provider  Allergies  Meds            Review of Systems   Constitutional: Negative for chills and fever.   HENT: Negative for congestion, ear pain, hearing loss and sore throat.    Eyes: Negative for pain and visual disturbance.   Respiratory: Negative for cough and shortness of breath.    Cardiovascular: Negative for chest pain, palpitations and peripheral edema.   Gastrointestinal: Negative for abdominal pain, constipation, diarrhea, heartburn, hematochezia and nausea.   Breasts:  Negative for tenderness, breast mass and discharge.   Genitourinary: Negative for dysuria, frequency, genital sores, hematuria, pelvic pain, urgency, vaginal bleeding and vaginal discharge.   Musculoskeletal: Negative for arthralgias, joint swelling and myalgias.   Skin: Negative for rash.   Neurological: Negative for dizziness, weakness, headaches and paresthesias.   Psychiatric/Behavioral: Negative for mood changes. The patient is not nervous/anxious.           OBJECTIVE:   /60   Pulse 69   Temp 98.6  F (37  C) (Oral)   Resp 12   Ht 1.626 m (5' 4\")   Wt 63.5 kg (140 lb)   LMP 02/08/2020   SpO2 97%   Breastfeeding No   BMI 24.03 kg/m    Physical Exam  GENERAL APPEARANCE: healthy, alert and no distress  EYES: Eyes grossly normal to inspection, PERRL and conjunctivae and sclerae normal  HENT: ear canals and TM's normal, nose and mouth without ulcers or lesions, oropharynx clear and oral mucous membranes moist  NECK: no adenopathy, no asymmetry, masses, or scars and thyroid normal to palpation  RESP: lungs clear to auscultation - no rales, rhonchi or wheezes  BREAST: normal without masses, tenderness or nipple discharge and no palpable axillary masses or adenopathy  CV: regular rate and rhythm, normal S1 S2, no S3 or S4, no murmur, click or rub, no peripheral edema and peripheral pulses " "strong  ABDOMEN: soft, nontender, no hepatosplenomegaly, no masses and bowel sounds normal  Pelvic exam: normal vagina and vulva, normal cervix without lesions or tenderness, uterus normal size anteverted, adenxa normal in size without tenderness, pap smear done  MS: no musculoskeletal defects are noted and gait is age appropriate without ataxia  SKIN: no suspicious lesions or rashes  NEURO: Normal strength and tone, sensory exam grossly normal, mentation intact and speech normal  PSYCH: mentation appears normal and affect normal/bright    Diagnostic Test Results:  Labs reviewed in Epic    ASSESSMENT/PLAN:       ICD-10-CM    1. Routine general medical examination at a health care facility  Z00.00 Lipid panel reflex to direct LDL Fasting     **Comprehensive metabolic panel FUTURE anytime     CBC with platelets differential     TSH with free T4 reflex     Pap imaged thin layer screen with HPV - recommended age 30 - 65 years (select HPV order below)     HPV High Risk Types DNA Cervical     GASTROENTEROLOGY ADULT REF PROCEDURE ONLY Yanci Gisele [phone]; [facility]       COUNSELING:  Reviewed preventive health counseling, as reflected in patient instructions       Regular exercise       Healthy diet/nutrition    Estimated body mass index is 24.03 kg/m  as calculated from the following:    Height as of this encounter: 1.626 m (5' 4\").    Weight as of this encounter: 63.5 kg (140 lb).         reports that she has never smoked. She has never used smokeless tobacco.      Counseling Resources:  ATP IV Guidelines  Pooled Cohorts Equation Calculator  Breast Cancer Risk Calculator  FRAX Risk Assessment  ICSI Preventive Guidelines  Dietary Guidelines for Americans, 2010  USDA's MyPlate  ASA Prophylaxis  Lung CA Screening    Kassi Garcia MD  [facility]  "

## 2020-03-11 LAB
ALBUMIN SERPL-MCNC: 3.7 G/DL (ref 3.4–5)
ALP SERPL-CCNC: 54 U/L (ref 40–150)
ALT SERPL W P-5'-P-CCNC: 20 U/L (ref 0–50)
ANION GAP SERPL CALCULATED.3IONS-SCNC: 4 MMOL/L (ref 3–14)
AST SERPL W P-5'-P-CCNC: 18 U/L (ref 0–45)
BILIRUB SERPL-MCNC: 0.7 MG/DL (ref 0.2–1.3)
BUN SERPL-MCNC: 10 MG/DL (ref 7–30)
CALCIUM SERPL-MCNC: 8.9 MG/DL (ref 8.5–10.1)
CHLORIDE SERPL-SCNC: 107 MMOL/L (ref 94–109)
CHOLEST SERPL-MCNC: 194 MG/DL
CO2 SERPL-SCNC: 26 MMOL/L (ref 20–32)
CREAT SERPL-MCNC: 0.8 MG/DL (ref 0.52–1.04)
GFR SERPL CREATININE-BSD FRML MDRD: 86 ML/MIN/{1.73_M2}
GLUCOSE SERPL-MCNC: 94 MG/DL (ref 70–99)
HDLC SERPL-MCNC: 92 MG/DL
LDLC SERPL CALC-MCNC: 88 MG/DL
NONHDLC SERPL-MCNC: 102 MG/DL
POTASSIUM SERPL-SCNC: 4.5 MMOL/L (ref 3.4–5.3)
PROT SERPL-MCNC: 7.7 G/DL (ref 6.8–8.8)
SODIUM SERPL-SCNC: 137 MMOL/L (ref 133–144)
T4 FREE SERPL-MCNC: 1.48 NG/DL (ref 0.76–1.46)
TRIGL SERPL-MCNC: 68 MG/DL
TSH SERPL DL<=0.005 MIU/L-ACNC: 0.26 MU/L (ref 0.4–4)

## 2020-03-13 LAB
COPATH REPORT: ABNORMAL
PAP: ABNORMAL

## 2020-03-16 PROBLEM — R87.612 PAPANICOLAOU SMEAR OF CERVIX WITH LOW GRADE SQUAMOUS INTRAEPITHELIAL LESION (LGSIL): Status: ACTIVE | Noted: 2020-03-10

## 2020-03-16 LAB
FINAL DIAGNOSIS: ABNORMAL
HPV HR 12 DNA CVX QL NAA+PROBE: POSITIVE
HPV16 DNA SPEC QL NAA+PROBE: NEGATIVE
HPV18 DNA SPEC QL NAA+PROBE: NEGATIVE
SPECIMEN DESCRIPTION: ABNORMAL
SPECIMEN SOURCE CVX/VAG CYTO: ABNORMAL

## 2020-03-16 NOTE — PROGRESS NOTES
10/23/14 NIL pap, + HR HPV (not 16/18). Cotest in 1 year  2/15/16 Dx pap NIL with Neg HPV. Plan: cotest in 3 years.   3/10/20 LSIL pap, + HR HPV (not 16/18). Plan colp.   03/18/20 LM for pt to call back (jose)  03/23/20 LM for pt to call back / Pt notified (jose)  03/30/20 Per OB/GYN, should have colp end of May or beginning of June 2020. LM for pt to call me back to move up colposcopy (jose)  6/8/20 [place]   06/15/20 Result Follow Up Encounter closed, now tracking in Cervical Cytology Tracker.

## 2020-05-12 DIAGNOSIS — E03.8 OTHER SPECIFIED HYPOTHYROIDISM: ICD-10-CM

## 2020-05-12 RX ORDER — LEVOTHYROXINE SODIUM 100 UG/1
100 TABLET ORAL DAILY
Qty: 90 TABLET | Refills: 0 | Status: SHIPPED | OUTPATIENT
Start: 2020-05-12 | End: 2020-11-19

## 2020-05-28 ENCOUNTER — ANCILLARY PROCEDURE (OUTPATIENT)
Dept: ULTRASOUND IMAGING | Facility: CLINIC | Age: 50
End: 2020-05-28
Attending: INTERNAL MEDICINE
Payer: COMMERCIAL

## 2020-05-28 DIAGNOSIS — N92.0 MENORRHAGIA WITH REGULAR CYCLE: ICD-10-CM

## 2020-05-28 PROCEDURE — 76856 US EXAM PELVIC COMPLETE: CPT | Performed by: OBSTETRICS & GYNECOLOGY

## 2020-05-28 PROCEDURE — 76830 TRANSVAGINAL US NON-OB: CPT | Performed by: OBSTETRICS & GYNECOLOGY

## 2020-06-03 ENCOUNTER — TELEPHONE (OUTPATIENT)
Dept: INTERNAL MEDICINE | Facility: CLINIC | Age: 50
End: 2020-06-03

## 2020-06-04 NOTE — TELEPHONE ENCOUNTER
Please let patient know that I would like her to follow up with gynecology for the heavy periods.    Her ultrasound revealed a normal uterine stripe.  Likely a benign fibroid also present.     Further work up is recommended for her heavy bleeding.      I cannot find where I had ordered the ultrasound on this patient.  Might have been at her last physical, but I do not see it was ordered then?

## 2020-06-08 ENCOUNTER — OFFICE VISIT (OUTPATIENT)
Dept: OBGYN | Facility: CLINIC | Age: 50
End: 2020-06-08
Payer: COMMERCIAL

## 2020-06-08 VITALS — BODY MASS INDEX: 24.2 KG/M2 | DIASTOLIC BLOOD PRESSURE: 70 MMHG | SYSTOLIC BLOOD PRESSURE: 120 MMHG | WEIGHT: 141 LBS

## 2020-06-08 DIAGNOSIS — B97.7 HPV IN FEMALE: Primary | ICD-10-CM

## 2020-06-08 DIAGNOSIS — N92.0 MENORRHAGIA WITH REGULAR CYCLE: ICD-10-CM

## 2020-06-08 PROCEDURE — 88305 TISSUE EXAM BY PATHOLOGIST: CPT | Mod: 26,59 | Performed by: FAMILY MEDICINE

## 2020-06-08 PROCEDURE — 88305 TISSUE EXAM BY PATHOLOGIST: CPT | Performed by: FAMILY MEDICINE

## 2020-06-08 PROCEDURE — 58110 BX DONE W/COLPOSCOPY ADD-ON: CPT | Performed by: FAMILY MEDICINE

## 2020-06-08 PROCEDURE — 57454 BX/CURETT OF CERVIX W/SCOPE: CPT | Performed by: FAMILY MEDICINE

## 2020-06-08 NOTE — NURSING NOTE
"Chief Complaint   Patient presents with     Colposcopy     LSIL with + HR HPV. Also heavy periods x 1 1/2yrs.        Initial /70   Wt 64 kg (141 lb)   LMP 05/19/2020   BMI 24.20 kg/m   Estimated body mass index is 24.2 kg/m  as calculated from the following:    Height as of 3/10/20: 1.626 m (5' 4\").    Weight as of this encounter: 64 kg (141 lb).  BP completed using cuff size: regular    Questioned patient about current smoking habits.  Pt. has never smoked.      No obstetric history on file.    The following HM Due: NONE      The following patient reported/Care Every where data was sent to:  P ABSTRACT QUALITY INITIATIVES [[id number]]  Miriam Javier LPN               "

## 2020-06-08 NOTE — PROGRESS NOTES
Gale Willett is a 49 year old  who presents for colposcopy for LSIL +HR other HPV.  She has had  prior history:  no.  Menorrhagia: we discussed office biopsy versus hysteroscopy, she desires office biopsy.       Risks and benefits were explained in detail prior to procedure including bleeding, infection and possible need for further treatment.  Informed consent was obtained to proceed.    TIME OUT DONE PRIOR TO PROCEDURE:   STATING PROCEDURE NAME         Procedure/Findings:  Patient was placed in dorsal lithotomy position. Speculum was inserted. Gross examination of the cervix revealed no pathology .  Acetic acid and Lugol's solution applied.    1. Colposcopy adequate:  No     2. Squamocolumnar junction visibility: completely visible    3. Normal colposcopic findings:      Original squamous epithelium:  mature    Other:  other: no     4. Abnormal colposcopic findings:  yes    Location of lesion:  inside transformation zone at 12, 3,9 o'clock    Size of lesion:  3 quadrants,  4% of cervix    Grade 1 (minor):  yes     Details:   fine mosaicism, fine punctation and thin acetowhite epithelium    Grade 2 (major):   no     Details:    no    Non-specific:         Leukoplakia: no        Erosion: no    Lugol's staining:  Non-stained    Suspicious for invasion: no     Details:  no    Miscellaneous findings: no    5.  3 biopsies were obtained at 12,3,9 o'clock.     ECC was  performed.     Hemostasis was achieved with monels.      Procedure:  Endometrial biopsy    Indication: Menometrorrhagia with age >35                   Endometrial cells on pap and age >40    Discussed risk of bleeding, infection, uterine perforation, cramping pain.  Pt agreed to proceed with procedure after all questions answered. INFORMED CONSENT OBTAINED   TIME OUT DONE PRIOR TO PROCEDURE:   STATING PROCEDURE NAME AND NEGATIVE URINE PREGNANCY TEST          Speculum placed and cervix visualized.  Cervix cleansed with betadine x 3.  Tenaculum placed on  anterior lip of the cervix.  Endometrial biopsy pipelle passed through cervix and uterus sounded to 7 cm.  Biopsy specimen collected with one pass with return of moderate amount of pink tissue.  Specimen placed in a labeled container and set aside to be sent to pathology.  Tenaculum removed from the cervix and sites hemostatic.  No bleeding noted from cervical os.     Patient tolerated the procedure well.  There were no apparent complications and bleeding was minimal.    She is instructed to use no tampons and have no intercourse for the next 5 days.        Procedure course: Patient tolerated procedure well  Assessment:  Normal exam without visible pathology, SOPHIA 1, SOPHIA 2 and SOPHIA 3  Plan:   if no dysplasia, repeat pap in 6/12 months, if CIN1, recommend      Repeat pap in 6/12 months or HPV in 1 year and if CIN2-3       recommend LEEP, Cryo or Repeat pap/colposcopy in 6/12      Months     Endometrial biopsy pending         Dr. Lady Vera, DO    OB/GYN   [facility]      2011 Colposcopic Terminology of the International Federation for Cervical Pathology and Colposcopy  Obstetrics and Gynecology, VOL. 120, NO. 1, JULY 2012

## 2020-06-08 NOTE — PATIENT INSTRUCTIONS
Will notify of result by Thursday     Dr. Lady Vera, DO    Obstetrics and Gynecology  [facility] [place] [facility] [place]

## 2020-06-10 LAB — COPATH REPORT: NORMAL

## 2020-06-15 ENCOUNTER — PATIENT OUTREACH (OUTPATIENT)
Dept: PEDIATRICS | Facility: CLINIC | Age: 50
End: 2020-06-15

## 2020-06-15 DIAGNOSIS — D06.9 SEVERE DYSPLASIA OF CERVIX (CIN III): ICD-10-CM

## 2020-06-15 DIAGNOSIS — Z11.59 ENCOUNTER FOR SCREENING FOR OTHER VIRAL DISEASES: Primary | ICD-10-CM

## 2020-06-15 NOTE — TELEPHONE ENCOUNTER
10/23/14 NIL pap, + HR HPV (not 16/18). Cotest in 1 year  2/15/16 Dx pap NIL with Neg HPV. Plan: cotest in 3 years.   3/10/20 LSIL pap, + HR HPV (not 16/18). Plan colp  06/08/20 [place]

## 2020-06-25 RX ORDER — LIDOCAINE 40 MG/G
CREAM TOPICAL
Status: CANCELLED | OUTPATIENT
Start: 2020-06-25

## 2020-06-25 RX ORDER — ONDANSETRON 2 MG/ML
4 INJECTION INTRAMUSCULAR; INTRAVENOUS
Status: CANCELLED | OUTPATIENT
Start: 2020-06-25

## 2020-06-28 DIAGNOSIS — Z11.59 ENCOUNTER FOR SCREENING FOR OTHER VIRAL DISEASES: ICD-10-CM

## 2020-06-28 PROCEDURE — U0003 INFECTIOUS AGENT DETECTION BY NUCLEIC ACID (DNA OR RNA); SEVERE ACUTE RESPIRATORY SYNDROME CORONAVIRUS 2 (SARS-COV-2) (CORONAVIRUS DISEASE [COVID-19]), AMPLIFIED PROBE TECHNIQUE, MAKING USE OF HIGH THROUGHPUT TECHNOLOGIES AS DESCRIBED BY CMS-2020-01-R: HCPCS | Performed by: INTERNAL MEDICINE

## 2020-06-29 LAB
SARS-COV-2 RNA SPEC QL NAA+PROBE: NOT DETECTED
SPECIMEN SOURCE: NORMAL

## 2020-07-01 ENCOUNTER — HOSPITAL ENCOUNTER (OUTPATIENT)
Facility: CLINIC | Age: 50
Discharge: HOME | End: 2020-07-01
Attending: INTERNAL MEDICINE | Admitting: INTERNAL MEDICINE
Payer: COMMERCIAL

## 2020-07-01 VITALS
BODY MASS INDEX: 24.2 KG/M2 | HEART RATE: 48 BPM | DIASTOLIC BLOOD PRESSURE: 76 MMHG | SYSTOLIC BLOOD PRESSURE: 107 MMHG | HEIGHT: 64 IN | RESPIRATION RATE: 16 BRPM | TEMPERATURE: 97.7 F | OXYGEN SATURATION: 97 %

## 2020-07-01 LAB — COLONOSCOPY: NORMAL

## 2020-07-01 PROCEDURE — 25000128 H RX IP 250 OP 636: Performed by: INTERNAL MEDICINE

## 2020-07-01 PROCEDURE — 88305 TISSUE EXAM BY PATHOLOGIST: CPT | Performed by: INTERNAL MEDICINE

## 2020-07-01 PROCEDURE — 45385 COLONOSCOPY W/LESION REMOVAL: CPT | Mod: PT | Performed by: INTERNAL MEDICINE

## 2020-07-01 PROCEDURE — G0500 MOD SEDAT ENDO SERVICE >5YRS: HCPCS | Performed by: INTERNAL MEDICINE

## 2020-07-01 PROCEDURE — 88305 TISSUE EXAM BY PATHOLOGIST: CPT | Mod: 26 | Performed by: INTERNAL MEDICINE

## 2020-07-01 RX ORDER — LIDOCAINE 40 MG/G
CREAM TOPICAL
Status: DISCONTINUED | OUTPATIENT
Start: 2020-07-01 | End: 2020-07-01 | Stop reason: HOSPADM

## 2020-07-01 RX ORDER — ONDANSETRON 4 MG/1
4 TABLET, ORALLY DISINTEGRATING ORAL EVERY 6 HOURS PRN
Status: DISCONTINUED | OUTPATIENT
Start: 2020-07-01 | End: 2020-07-01 | Stop reason: HOSPADM

## 2020-07-01 RX ORDER — FENTANYL CITRATE 50 UG/ML
INJECTION, SOLUTION INTRAMUSCULAR; INTRAVENOUS PRN
Status: DISCONTINUED | OUTPATIENT
Start: 2020-07-01 | End: 2020-07-01 | Stop reason: HOSPADM

## 2020-07-01 RX ORDER — ONDANSETRON 2 MG/ML
4 INJECTION INTRAMUSCULAR; INTRAVENOUS EVERY 6 HOURS PRN
Status: DISCONTINUED | OUTPATIENT
Start: 2020-07-01 | End: 2020-07-01 | Stop reason: HOSPADM

## 2020-07-01 RX ORDER — ONDANSETRON 2 MG/ML
4 INJECTION INTRAMUSCULAR; INTRAVENOUS
Status: DISCONTINUED | OUTPATIENT
Start: 2020-07-01 | End: 2020-07-01 | Stop reason: HOSPADM

## 2020-07-01 RX ORDER — FLUMAZENIL 0.1 MG/ML
0.2 INJECTION, SOLUTION INTRAVENOUS
Status: DISCONTINUED | OUTPATIENT
Start: 2020-07-01 | End: 2020-07-01 | Stop reason: HOSPADM

## 2020-07-01 RX ORDER — NALOXONE HYDROCHLORIDE 0.4 MG/ML
.1-.4 INJECTION, SOLUTION INTRAMUSCULAR; INTRAVENOUS; SUBCUTANEOUS
Status: DISCONTINUED | OUTPATIENT
Start: 2020-07-01 | End: 2020-07-01 | Stop reason: HOSPADM

## 2020-07-01 NOTE — LETTER
Hillary 15, 2020      Gale Willett  [address] [facility] [id number]        Dear Gale,   Please be aware that coverage of these services is subject to the terms and limitations of your health insurance plan.  Call member services at your health plan with any benefit or coverage questions.    Thank you for choosing [facility]. You are scheduled for the following service(s):    Date: 7/1/20             Procedure:  COLONOSCOPY  Doctor:       Dr Quintero   Arrival Time:8.15 am  *Check in at Main Hospital Entrance.  Procedure Time: 8:45 am     Location:   [facility]        Endoscopy Department, First Floor         [address] [place], [place] [address]      [phone] or [phone] ([facility]) to reschedule      MIRALAX -GATORADE  PREP  Colonoscopy is the most accurate test to detect colon polyps and colon cancer; and the only test where polyps can be removed. During this procedure, a doctor examines the lining of your large intestine and rectum through a flexible tube.   Transportation  You must arrange for a ride for the day of your procedure with a responsible adult. A taxi , Uber, etc, is not an option unless you are accompanied by a responsible adult. If you fail to arrange transportation with a responsible adult, your procedure will be cancelled and rescheduled.    Purchase the  following supplies at your local pharmacy:  - 2 (two) bisacodyl tablets: each tablet contains 5 mg.  (Dulcolax  laxative NOT Dulcolax  stool softener)   - 1 (one) 8.3 oz bottle of Polyethylene Glycol (PEG) 3350 Powder   (MiraLAX , Smooth LAX , ClearLAX  or equivalent)  - 64 oz Gatorade    Regular Gatorade, Gatorade G2 , Powerade , Powerade Zero  or Pedialyte  is acceptable. Red colored flavors are not allowed; all other colors (yellow, green, orange, purple and blue) are okay. It is also okay to buy two 2.12 oz packets of powdered Gatorade that can be mixed with water to a  total volume of 64 oz of liquid.  - 1 (one) 10 oz bottle of Magnesium Citrate (Red colored flavors are not allowed)  It is also okay for you to use a 0.5 oz package of powdered magnesium citrate (17 g) mixed with 10 oz of water.      PREPARATION FOR COLONOSCOPY    7 days before:    Discontinue fiber supplements and medications containing iron. This includes Metamucil  and Fibercon ; and multivitamins with iron.    3 days before:    Begin a low-fiber diet. A low-fiber diet helps making the cleanout more effective.     Examples of a low-fiber diet include (but are not limited to): white bread, white rice, pasta, crackers, fish, chicken, eggs, ground beef, creamy peanut butter, cooked/steamed/boiled vegetables, canned fruit, bananas, melons, milk, plain yogurt cheese, salad dressing and other condiments.     The following are not allowed on a low-fiber diet: seeds, nuts, popcorn, bran, whole wheat, corn, quinoa, raw fruits and vegetables, berries and dried fruit, beans and lentils.    For additional details on low-fiber diet, please refer to the table on the last page.    2 days before:    Continue the low-fiber diet.     Drink at least 8 glasses of water throughout the day.     Stop eating solid foods at 11:45 pm.    1 day before:    In the morning: begin a clear liquid diet (liquids you can see through).     Examples of a clear liquid diet include: water, clear broth or bouillon, Gatorade, Pedialyte or Powerade, carbonated and non-carbonated soft drinks (Sprite , 7-Up , ginger ale), strained fruit juices without pulp (apple, white grape, white cranberry), Jell-O  and popsicles.     The following are not allowed on a clear liquid diet: red liquids, alcoholic beverages, dairy products (milk, creamer, and yogurt), protein shakes, creamy broths, juice with pulp and chewing tobacco.    At noon: take 2 (two) bisacodyl tablets     At 4 (and no later than 6pm): start drinking the Miralax-Gatorade preparation (8.3 oz of  Miralax mixed with 64 oz of Gatorade in a large pitcher). Drink 1(one) 8 oz glass every 15 minutes thereafter, until the mixture is gone.    COLON CLEANSING TIPS: drink adequate amounts of fluids before and after your colon cleansing to prevent dehydration. Stay near a toilet because you will have diarrhea. Even if you are sitting on the toilet, continue to drink the cleansing solution every 15 minutes. If you feel nauseous or vomit, rinse your mouth with water, take a 15 to 30-minute-break and then continue drinking the solution. You will be uncomfortable until the stool has flushed from your colon (in about 2 to 4 hours). You may feel chilled.    Day of your procedure  You may take all of your morning medications including blood pressure medications, blood thinners (if you have not been instructed to stop these by our office), methadone, anti-seizure medications with sips of water 3 hours prior to your procedure or earlier. Do not take insulin or vitamins prior to your procedure. Continue the clear liquid diet.       4 hours prior: drink 10 oz of magnesium citrate. It may be easier to drink it with a straw.    STOP consuming all liquids after that.     Do not take anything by mouth during this time.     Allow extra time to travel to your procedure as you may need to stop and use a restroom along the way.    You are ready for the procedure, if you followed all instructions and your stool is no longer formed, but clear or yellow liquid. If you are unsure whether your colon is clean, please call our office at [phone] before you leave for your appointment.    Bring the following to your procedure:  - Insurance Card/Photo ID.   - List of current medications including over-the-counter medications and supplements.   - Your rescue inhaler if you currently use one to control asthma.    Canceling or rescheduling your appointment:   If you must cancel or reschedule your appointment, please call [phone] as soon as  possible.      COLONOSCOPY PRE-PROCEDURE CHECKLIST    If you have diabetes, ask your regular doctor for diet and medication restrictions.  If you take an anticoagulant or anti-platelet medication (such as Coumadin , Lovenox , Pradaxa , Xarelto , Eliquis , etc.), please call your primary doctor for advice on holding this medication.  If you take aspirin you may continue to do so.  If you are or may be pregnant, please discuss the risks and benefits of this procedure with your doctor.        What happens during a colonoscopy?    Plan to spend up to two hours, starting at registration time, at the endoscopy center the day of your procedure. The colonoscopy takes an average of 15 to 30 minutes. Recovery time is about 30 minutes.      Before the exam:    You will change into a gown.    Your medical history and medication list will be reviewed with you, unless that has been done over the phone prior to the procedure.     A nurse will insert an intravenous (IV) line into your hand or arm.    The doctor will meet with you and will give you a consent form to sign.  During the exam:     Medicine will be given through the IV line to help you relax.     Your heart rate and oxygen levels will be monitored. If your blood pressure is low, you may be given fluids through the IV line.     The doctor will insert a flexible hollow tube, called a colonoscope, into your rectum. The scope will be advanced slowly through the large intestine (colon).    You may have a feeling of fullness or pressure.     If an abnormal tissue or a polyp is found, the doctor may remove it through the endoscope for closer examination, or biopsy. Tissue removal is painless    After the exam:           Any tissue samples removed during the exam will be sent to a lab for evaluation. It may take 5-7 working days for you to be notified of the results.     A nurse will provide you with complete discharge instructions before you leave the endoscopy center. Be sure  to ask the nurse for specific instructions if you take blood thinners such as Aspirin, Coumadin or Plavix.     The doctor will prepare a full report for you and for the physician who referred you for the procedure.     Your doctor will talk with you about the initial results of your exam.      Medication given during the exam will prohibit you from driving for the rest of the day.     Following the exam, you may resume your normal diet. Your first meal should be light, no greasy foods. Avoid alcohol until the next day.     You may resume your regular activities the day after the procedure.         LOW-FIBER DIET    Foods RECOMMENDED Foods to AVOID   Breads, Cereal, Rice and Pasta:   White bread, rolls, biscuits, croissant and patricia toast.   Waffles, Indonesian toast and pancakes.   White rice, noodles, pasta, macaroni and peeled cooked potatoes.   Plain crackers and saltines.   Cooked cereals: farina, cream of rice.   Cold cereals: Puffed Rice , Rice Krispies , Corn Flakes  and Special K    Breads, Cereal, Rice and Pasta:   Breads or rolls with nuts, seeds or fruit.   Whole wheat, pumpernickel, rye breads and cornbread.   Potatoes with skin, brown or wild rice, and kasha (buckwheat).     Vegetables:   Tender cooked and canned vegetables without seeds: carrots, asparagus tips, green or wax beans, pumpkin, spinach, lima beans. Vegetables:   Raw or steamed vegetables.   Vegetables with seeds.   Sauerkraut.   Winter squash, peas, broccoli, Brussel sprouts, cabbage, onions, cauliflower, baked beans, peas and corn.   Fruits:   Strained fruit juice.   Canned fruit, except pineapple.   Ripe bananas and melon. Fruits:   Prunes and prune juice.   Raw fruits.   Dried fruits: figs, dates and raisins.   Milk/Dairy:   Milk: plain or flavored.   Yogurt, custard and ice cream.   Cheese and cottage cheese Milk/Dairy:     Meat and other proteins:   ground, well-cooked tender beef, lamb, ham, veal, pork, fish, poultry and organ  meats.   Eggs.   Peanut butter without nuts. Meat and other proteins:   Tough, fibrous meats with gristle.   Dry beans, peas and lentils.   Peanut butter with nuts.   Tofu.   Fats, Snack, Sweets, Condiments and Beverages:   Margarine, butter, oils, mayonnaise, sour cream and salad dressing, plain gravy.   Sugar, hard candy, clear jelly, honey and syrup.   Spices, cooked herbs, bouillon, broth and soups made with allowed vegetable, ketchup and mustard.   Coffee, tea and carbonated drinks.   Plain cakes, cookies and pretzels.   Gelatin, plain puddings, custard, ice cream, sherbet and popsicles. Fats, Snack, Sweets, Condiments and Beverages:   Nuts, seeds and coconut.   Jam, marmalade and preserves.   Pickles, olives, relish and horseradish.   All desserts containing nuts, seeds, dried fruit and coconut; or made from whole grains or bran.   Candy made with nuts or seeds.   Popcorn.           DIRECTIONS TO THE ENDOSCOPY DEPARTMENT     From the north ([facility])  Take [address] South, exit on [address] [address]. Get into the left hand oren, turn left (east), go one-half mile to [address] and turn left. Go north to the first stoplight, take a right on [address] and follow it to the Main entrance.    From the south ([facility])  Take 35N to the 35E split and exit on [address] [address]. On [address] [address], turn left (west) to [address]. Turn right (north) on [address]. Go north to the first stoplight, take a right on [address] and follow it to the Main entrance.    From the east via 35E ([facility])  Take [address]E south to [address] [address] exit. Turn right on [address] [address]. Go west to [address]. Turn right (north) on [address]. Go to the first stoplight, take a right and follow on [address] to the Main entrance.    From the east via [address] ([facility])  Take [address] to [address]. Turn left (south) on [address] to [address].  Turn left (east) on [address] and follow it to the Main entrance.    From the west via Highway 13 ([place], [place])  Take [address] east to [address]. Turn right (south) on [address] to [facility]. Turn left (east) on [address] and follow it to the Main entrance.

## 2020-07-01 NOTE — CONSULTS
Pre-Endoscopy History and Physical     Gale Willett MRN# [id number]   YOB: 1970 Age: 49 year old     Date of Procedure: 2020  Primary care provider: Kassi Garcia  Type of Endoscopy: colonoscopy  Reason for Procedure: colon cancer screening  Type of Anesthesia Anticipated: Moderate (conscious) sedation    HPI:    Gale is a 49 year old female who will be undergoing the above procedure.      A history and physical has been performed. The patient's medications and allergies have been reviewed. The risks and benefits of the procedure and the sedation options and risks were discussed with the patient.  All questions were answered and informed consent was obtained.      She denies a personal or family history of anesthesia complications or bleeding disorders.     No Known Allergies     No current facility-administered medications for this encounter.        Patient Active Problem List   Diagnosis     Routine general medical examination at a health care facility     Family history of Alzheimer's disease     Hypothyroidism     History of thyroid cancer     High risk HPV infection     High risk medications (not anticoagulants) long-term use     Family history of osteoporosis     Postsurgical hypothyroidism     Vitamin D insufficiency     Memory loss     Severe dysplasia of cervix (SOPHIA III)        Past Medical History:   Diagnosis Date     Abnormal Pap smear of cervix 03/10/2020    see problem list     Cancer (H)     thyroid cancer- age 40 s/p partial thyroidectomy     High risk HPV infection 10/23/14, 3/10/20    Not HPV 16 or 18     Thyroid disease         Past Surgical History:   Procedure Laterality Date     BIOPSY  2012    Neck      SECTION  2003     GYN SURGERY         Social History     Tobacco Use     Smoking status: Never Smoker     Smokeless tobacco: Never Used   Substance Use Topics     Alcohol use: Yes     Alcohol/week: 0.0 standard drinks     Comment: 1-2 times per week (one  "glass of wine)       Family History   Problem Relation Age of Onset     Hypertension Mother      Alzheimer Disease Mother         under age 50     Hyperlipidemia Mother      Cerebrovascular Disease Mother      Hypertension Father      Hyperlipidemia Father      Obesity Father      Colon Cancer No family hx of        REVIEW OF SYSTEMS:     5 point ROS negative except as noted above in HPI, including Gen., Resp., CV, GI &  system review.    PHYSICAL EXAM:   There were no vitals taken for this visit. Estimated body mass index is 24.2 kg/m  as calculated from the following:    Height as of 3/10/20: 1.626 m (5' 4\").    Weight as of 6/8/20: 64 kg (141 lb).   GENERAL APPEARANCE: healthy  MENTAL STATUS: alert  AIRWAY EXAM: Mallampatti Class I (visualization of the soft palate, fauces, uvula, anterior and posterior pillars)  RESP: lungs clear to auscultation - no rales, rhonchi or wheezes  CV: regular rates and rhythm    DIAGNOSTICS:      Not indicated    IMPRESSION     ASA Class 2 - Mild systemic disease    PLAN:     Colonoscopy    The above has been forwarded to the consulting provider.    Signed Electronically by: Keo Fay MD  July 1, 2020    "

## 2020-07-02 LAB — COPATH REPORT: NORMAL

## 2020-07-24 ENCOUNTER — NURSE TRIAGE (OUTPATIENT)
Dept: NURSING | Facility: CLINIC | Age: 50
End: 2020-07-24

## 2020-08-07 ENCOUNTER — OFFICE VISIT (OUTPATIENT)
Dept: OBGYN | Facility: CLINIC | Age: 50
End: 2020-08-07
Payer: COMMERCIAL

## 2020-08-07 VITALS
DIASTOLIC BLOOD PRESSURE: 80 MMHG | SYSTOLIC BLOOD PRESSURE: 118 MMHG | HEIGHT: 64 IN | BODY MASS INDEX: 23.56 KG/M2 | WEIGHT: 138 LBS

## 2020-08-07 DIAGNOSIS — B97.7 HPV IN FEMALE: ICD-10-CM

## 2020-08-07 DIAGNOSIS — D06.9 SEVERE DYSPLASIA OF CERVIX (CIN III): ICD-10-CM

## 2020-08-07 DIAGNOSIS — N92.0 MENORRHAGIA WITH REGULAR CYCLE: ICD-10-CM

## 2020-08-07 DIAGNOSIS — Z20.822 EXPOSURE TO COVID-19 VIRUS: Primary | ICD-10-CM

## 2020-08-07 PROCEDURE — 99214 OFFICE O/P EST MOD 30 MIN: CPT | Performed by: OBSTETRICS & GYNECOLOGY

## 2020-08-07 ASSESSMENT — MIFFLIN-ST. JEOR: SCORE: 1225.96

## 2020-08-07 NOTE — PROGRESS NOTES
"  SUBJECTIVE:                                                   CC:  Patient presents with:  Consult: possible hysterectomy      HPI:  Gale Willett is a 50 year old P2 who presents for discussion of AUB and cervical dysplasia, previously has discussed with Dr Vera, now would like to review options for treatment.      H/o CS x1 for twins.   Had rectus muscle reapproximation and abdominoplasty. No other abdominal surgeries.       ROS: 10 point ROS negative other than as listed above in HPI.    Gyn History:  Patient's last menstrual period was 06/18/2020 (exact date).  .    Last 3 Pap and HPV Results:   PAP / HPV Latest Ref Rng & Units 3/10/2020 2/15/2016 10/23/2014   PAP - LSIL(A) NIL NIL   HPV 16 DNA NEG:Negative Negative Negative -   HPV 18 DNA NEG:Negative Negative Negative -   OTHER HR HPV NEG:Negative Positive(A) Negative -       PMH, PSH, Soc Hx, Fam Hx, Meds, and allergies reviewed in Epic.  Denies family history of gyn cancer    OBJECTIVE:     /80 (BP Location: Left arm, Patient Position: Chair, Cuff Size: Adult Large)   Ht 1.626 m (5' 4\")   Wt 62.6 kg (138 lb)   LMP 06/18/2020 (Exact Date)   Breastfeeding No   BMI 23.69 kg/m      Gen: Healthy appearing thin female, no acute distress, comfortable     Cervix, 9:00, biopsy:   - Small fragments of superficial ectocervix, transformation zone and   endocervix present.   - High-grade squamous intraepithelial lesion (SOPHIA 2 and 3/moderate and   severe dysplasia) with involvement of   endocervical glands.   - No evidence of invasion in the specimen available for evaluation.     Endocervix, curettings:   - Ectocervical squamous epithelium and endocervical tissue present.   - Small fragments of dysplastic epithelium, consistent with high-grade   squamous intraepithelial lesion.     E.  Endometrium, biopsy:   - Early secretory endometrium.   - Negative for endometrial polyp, hyperplasia and malignancy.     ASSESSMENT/PLAN:                                 "                      1. Severe dysplasia of cervix (SOPHIA III)  Reviewed mgmt of high grade dysplasia, that we recommend LEEP followed by at least 20 years of surveillance even if everything comes back normal.  Discussed HPV role in developing cervical cancer.  Discussed role of LEEP in hopefully curing the precancerous state.  - Domonique-Operative Worksheet GYN    2. Menorrhagia with regular cycle  Desires hysterectomy as definitive treatment option after having considered others.  She still has regular periods, no sign of impending menopause.  She desires removal of the cervix as well but understands that she will still need 20 years of post hysterectomy surveillance and that the LEEP is still recommended. Recommend total laparoscopic hysterectomy, bilateral salpingectomy.  Discussed risks of surgery including bleeding to the point of requiring blood transfusion, infection, injury to surrounding organs, possibility of needing a larger incision, blood clot, and pneumonia.  Discussed recovery expectations and that this is typically a same-day surgery.  Pt has had all questions answered and has agreed to proceed.  Will meet with PCP for pre-op physical with PCP.  Will sign consent form day of the procedure.  Pre-op orders entered.   - Domonique-Operative Worksheet GYN    3. Exposure to COVID-19 virus  Her daughters tested positive, she was sick back in February but never had testing done at that time.  Wonders if she could have had an early exposure.  She understands she will still need to obtain testing prior to hysterectomy even if IgG pos.   - COVID-19 Virus (Coronavirus) Antibody Screen, IgG; Future       Natasha Don MD, MPH  Obstetrics and Gynecology      Total time spent was 25 minutes; greater than 50% of time was spent in counseling and/or coordination of care for the above listed diagnoses.

## 2020-08-07 NOTE — NURSING NOTE
"Chief Complaint   Patient presents with     Consult     possible hysterectomy       Initial /80 (BP Location: Left arm, Patient Position: Chair, Cuff Size: Adult Large)   Ht 1.626 m (5' 4\")   Wt 62.6 kg (138 lb)   LMP 06/18/2020 (Exact Date)   Breastfeeding No   BMI 23.69 kg/m   Estimated body mass index is 23.69 kg/m  as calculated from the following:    Height as of this encounter: 1.626 m (5' 4\").    Weight as of this encounter: 62.6 kg (138 lb).  BP completed using cuff size: regular    Questioned patient about current smoking habits.  Pt. has never smoked.      No obstetric history on file.    The following HM Due: NONE    Mery Chapin CMA    "

## 2020-08-10 ENCOUNTER — TELEPHONE (OUTPATIENT)
Dept: OBGYN | Facility: CLINIC | Age: 50
End: 2020-08-10

## 2020-08-10 ENCOUNTER — PREP FOR PROCEDURE (OUTPATIENT)
Dept: OBGYN | Facility: CLINIC | Age: 50
End: 2020-08-10

## 2020-08-10 DIAGNOSIS — N87.9 CERVICAL DYSPLASIA: Primary | ICD-10-CM

## 2020-08-10 DIAGNOSIS — N92.0 MENORRHAGIA: ICD-10-CM

## 2020-08-10 NOTE — TELEPHONE ENCOUNTER
Procedure name(s) - multi select  total laparoscopic hysterectomy, bilateral salpingectomy    Reason for procedure  cervical dysplasia, menorrhagia    Is this a multi surgeon case?  No    Laterality  Bilateral    Request for additional equipment  Other (see comments)    Comment: None    Anesthesia  General    Initiate Pre-op orders for above procedure:  Yes, as ordered in Epic    Comment: Additional orders noted there also    Location of Case:  [facility]    Surgeon Procedure Time (incision to closure) in minutes (per procedure as applicable)  90    Note:  Surgical Case Time Needed (in minutes)    Patient Class (for admit prior to surgery, specify number of days in comments):  Same day (hospital outpatient)    Why can t this outpatient surgery be done at the [facility] or  ASC?  don't have correct instruments    H&P To Be Completed By:  PCP    Vendor Needed?  No    Other/Additional Comments, as needed:  please schedule with Chiquita

## 2020-08-11 DIAGNOSIS — Z20.822 EXPOSURE TO COVID-19 VIRUS: ICD-10-CM

## 2020-08-11 PROCEDURE — 86769 SARS-COV-2 COVID-19 ANTIBODY: CPT | Performed by: OBSTETRICS & GYNECOLOGY

## 2020-08-11 PROCEDURE — 36415 COLL VENOUS BLD VENIPUNCTURE: CPT | Performed by: OBSTETRICS & GYNECOLOGY

## 2020-08-12 LAB
COVID-19 ANTIBODY IGG: NEGATIVE
LAB TEST METHOD: NORMAL

## 2020-08-14 ENCOUNTER — OFFICE VISIT (OUTPATIENT)
Dept: OBGYN | Facility: CLINIC | Age: 50
End: 2020-08-14
Payer: COMMERCIAL

## 2020-08-14 VITALS
WEIGHT: 138 LBS | SYSTOLIC BLOOD PRESSURE: 120 MMHG | HEIGHT: 64 IN | BODY MASS INDEX: 23.56 KG/M2 | DIASTOLIC BLOOD PRESSURE: 80 MMHG

## 2020-08-14 DIAGNOSIS — D06.9 CARCINOMA IN SITU OF CERVIX, UNSPECIFIED LOCATION: Primary | ICD-10-CM

## 2020-08-14 PROCEDURE — 88307 TISSUE EXAM BY PATHOLOGIST: CPT | Performed by: OBSTETRICS & GYNECOLOGY

## 2020-08-14 PROCEDURE — 88305 TISSUE EXAM BY PATHOLOGIST: CPT | Performed by: OBSTETRICS & GYNECOLOGY

## 2020-08-14 PROCEDURE — 88307 TISSUE EXAM BY PATHOLOGIST: CPT | Mod: 26 | Performed by: OBSTETRICS & GYNECOLOGY

## 2020-08-14 PROCEDURE — 57461 CONZ OF CERVIX W/SCOPE LEEP: CPT | Performed by: OBSTETRICS & GYNECOLOGY

## 2020-08-14 PROCEDURE — 88305 TISSUE EXAM BY PATHOLOGIST: CPT | Mod: 26 | Performed by: OBSTETRICS & GYNECOLOGY

## 2020-08-14 ASSESSMENT — MIFFLIN-ST. JEOR: SCORE: 1225.96

## 2020-08-14 NOTE — PROGRESS NOTES
LEEP Procedure Note    Gale Willett   1970  MRN [id number]    The patient was counseled on the risks (including including risk of infection, bleeding, recurrence), benefits, and alternatives of the procedure. Verbal and written consent were obtained.      Technique: The patient was placed in the dorsal lithotomy position.  A coated speculum was placed in the vagina and the cervix visualized. Acetic acid was applied to the vagina and cervix, and then visualized using the colposcope.  Acetowhite staining was noted very scant at 6 o clock.  An intracervical block with lidocaine was placed in four quadrants of the cervix.  A green loop electrode was used and a cone of the cervix was removed.  Endocervical curettage was performed. A ball electrode was used on coagulation electrocautery to achieve good hemostasis of the cervix.  Monsell's solution was applied to assure excellent hemostasis.  The patient tolerated the procedure well.  She was given post op instructions which included activity and pelvic restrictions.      She has a hysterectomy pending, will rule out cancer and then proceed with scheduling with benign gyn.    A/P:  1. (SOPHIA 3) Carcinoma in situ of cervix, unspecified location  - Surgical pathology exam  - COLP CERVIX/UPPER VAGINA W LOOP ELEC BX CERVIX        Natasha Don MD, MPH  [facility]

## 2020-08-14 NOTE — NURSING NOTE
"Chief Complaint   Patient presents with     Leep     maribell 1       Initial /80 (BP Location: Left arm, Patient Position: Chair, Cuff Size: Adult Regular)   Ht 1.626 m (5' 4\")   Wt 62.6 kg (138 lb)   Breastfeeding No   BMI 23.69 kg/m   Estimated body mass index is 23.69 kg/m  as calculated from the following:    Height as of this encounter: 1.626 m (5' 4\").    Weight as of this encounter: 62.6 kg (138 lb).  BP completed using cuff size: regular    Questioned patient about current smoking habits.  Pt. has never smoked.      No obstetric history on file.    The following HM Due: NONE    Mery Chapin CMA    "

## 2020-08-18 LAB — COPATH REPORT: NORMAL

## 2020-08-20 DIAGNOSIS — Z11.59 ENCOUNTER FOR SCREENING FOR OTHER VIRAL DISEASES: Primary | ICD-10-CM

## 2020-08-20 PROBLEM — N92.0 MENORRHAGIA: Status: ACTIVE | Noted: 2020-08-20

## 2020-08-20 PROBLEM — N87.9 CERVICAL DYSPLASIA: Status: ACTIVE | Noted: 2020-08-20

## 2020-08-20 NOTE — TELEPHONE ENCOUNTER
Type of surgery: total laparoscopic hysterectomy, bilateral salpingectomy  Location of surgery: [facility]  Date and time of surgery: 9/23/20 @ 10:10 am  Surgeon: Dr. Hue Don  Pre-Op Appt Date: Patient advised to schedule.  Post-Op Appt Date: 11/2/20   Packet sent out: Yes  Pre-cert/Authorization completed:  No  Date: 8/20/20

## 2020-08-28 ENCOUNTER — PATIENT OUTREACH (OUTPATIENT)
Dept: OBGYN | Facility: CLINIC | Age: 50
End: 2020-08-28

## 2020-08-28 DIAGNOSIS — D06.9 SEVERE DYSPLASIA OF CERVIX (CIN III): ICD-10-CM

## 2020-09-11 NOTE — TELEPHONE ENCOUNTER
10/23/14 NIL pap, + HR HPV (not 16/18). Cotest in 1 year  2/15/16 Dx pap NIL with Neg HPV. Plan: cotest in 3 years.   3/10/20 LSIL pap, + HR HPV (not 16/18). Plan colp  06/08/20 [place] Bx - SOPHIA 3, ECC - high grade, EMB - Negative. Plan LEEP. CCT Tracking.  08/14/20 LEEP BX: SOPHIA 3, margins positive. Plan hysterectomy

## 2020-09-18 ASSESSMENT — MIFFLIN-ST. JEOR: SCORE: 1189.68

## 2020-09-20 DIAGNOSIS — Z11.59 ENCOUNTER FOR SCREENING FOR OTHER VIRAL DISEASES: ICD-10-CM

## 2020-09-20 PROCEDURE — U0003 INFECTIOUS AGENT DETECTION BY NUCLEIC ACID (DNA OR RNA); SEVERE ACUTE RESPIRATORY SYNDROME CORONAVIRUS 2 (SARS-COV-2) (CORONAVIRUS DISEASE [COVID-19]), AMPLIFIED PROBE TECHNIQUE, MAKING USE OF HIGH THROUGHPUT TECHNOLOGIES AS DESCRIBED BY CMS-2020-01-R: HCPCS | Performed by: OBSTETRICS & GYNECOLOGY

## 2020-09-21 ENCOUNTER — MYC MEDICAL ADVICE (OUTPATIENT)
Dept: INTERNAL MEDICINE | Facility: CLINIC | Age: 50
End: 2020-09-21

## 2020-09-21 ENCOUNTER — OFFICE VISIT (OUTPATIENT)
Dept: INTERNAL MEDICINE | Facility: CLINIC | Age: 50
End: 2020-09-21
Payer: COMMERCIAL

## 2020-09-21 VITALS
HEART RATE: 48 BPM | OXYGEN SATURATION: 100 % | DIASTOLIC BLOOD PRESSURE: 80 MMHG | HEIGHT: 64 IN | TEMPERATURE: 97.5 F | WEIGHT: 138.7 LBS | BODY MASS INDEX: 23.68 KG/M2 | SYSTOLIC BLOOD PRESSURE: 120 MMHG

## 2020-09-21 DIAGNOSIS — N87.9 CERVIX DYSPLASIA: Primary | ICD-10-CM

## 2020-09-21 DIAGNOSIS — Z01.818 PREOP GENERAL PHYSICAL EXAM: ICD-10-CM

## 2020-09-21 DIAGNOSIS — E89.0 POSTSURGICAL HYPOTHYROIDISM: ICD-10-CM

## 2020-09-21 DIAGNOSIS — N92.4 MENOPAUSAL MENORRHAGIA: ICD-10-CM

## 2020-09-21 LAB
ANION GAP SERPL CALCULATED.3IONS-SCNC: 2 MMOL/L (ref 3–14)
BUN SERPL-MCNC: 19 MG/DL (ref 7–30)
CALCIUM SERPL-MCNC: 9.3 MG/DL (ref 8.5–10.1)
CHLORIDE SERPL-SCNC: 107 MMOL/L (ref 94–109)
CO2 SERPL-SCNC: 28 MMOL/L (ref 20–32)
CREAT SERPL-MCNC: 0.82 MG/DL (ref 0.52–1.04)
ERYTHROCYTE [DISTWIDTH] IN BLOOD BY AUTOMATED COUNT: 13 % (ref 10–15)
GFR SERPL CREATININE-BSD FRML MDRD: 82 ML/MIN/{1.73_M2}
GLUCOSE SERPL-MCNC: 80 MG/DL (ref 70–99)
HCT VFR BLD AUTO: 43 % (ref 35–47)
HGB BLD-MCNC: 14 G/DL (ref 11.7–15.7)
MCH RBC QN AUTO: 29.4 PG (ref 26.5–33)
MCHC RBC AUTO-ENTMCNC: 32.6 G/DL (ref 31.5–36.5)
MCV RBC AUTO: 90 FL (ref 78–100)
PLATELET # BLD AUTO: 192 10E9/L (ref 150–450)
POTASSIUM SERPL-SCNC: 4.6 MMOL/L (ref 3.4–5.3)
RBC # BLD AUTO: 4.76 10E12/L (ref 3.8–5.2)
SARS-COV-2 RNA SPEC QL NAA+PROBE: NOT DETECTED
SODIUM SERPL-SCNC: 137 MMOL/L (ref 133–144)
SPECIMEN SOURCE: NORMAL
T4 FREE SERPL-MCNC: 1.39 NG/DL (ref 0.76–1.46)
TSH SERPL DL<=0.005 MIU/L-ACNC: 0.16 MU/L (ref 0.4–4)
WBC # BLD AUTO: 3.8 10E9/L (ref 4–11)

## 2020-09-21 PROCEDURE — 84439 ASSAY OF FREE THYROXINE: CPT | Performed by: INTERNAL MEDICINE

## 2020-09-21 PROCEDURE — 85027 COMPLETE CBC AUTOMATED: CPT | Performed by: INTERNAL MEDICINE

## 2020-09-21 PROCEDURE — 99214 OFFICE O/P EST MOD 30 MIN: CPT | Performed by: INTERNAL MEDICINE

## 2020-09-21 PROCEDURE — 36415 COLL VENOUS BLD VENIPUNCTURE: CPT | Performed by: INTERNAL MEDICINE

## 2020-09-21 PROCEDURE — 84443 ASSAY THYROID STIM HORMONE: CPT | Performed by: INTERNAL MEDICINE

## 2020-09-21 PROCEDURE — 80048 BASIC METABOLIC PNL TOTAL CA: CPT | Performed by: INTERNAL MEDICINE

## 2020-09-21 ASSESSMENT — MIFFLIN-ST. JEOR: SCORE: 1229.14

## 2020-09-21 NOTE — PATIENT INSTRUCTIONS
PRE-OPERATIVE INSTRUCTIONS:     *  Contact your surgeon if there is any change in your health. This includes signs of new infection, such as cold or flu (such as a sore throat, runny nose, cough, rash or fever)      *  Complete any Covid testing within 48-72 hours of the surgery.  The surgeon's office is responsible for arranging and completing this testing before surgery.  Contact the surgery office for questions about this.      *  Stop aspirin of any kind for 7 days before procedure.   (even low dose daily aspirin).    *  No NSAIDs (Motrin, Advil, ibuprofen, Aleve, etc) within 5-7 days of surgery.    *  Stop any Fish Oil or vitamin E supplements for 7 days prior to surgery because these can affect platelet function.      *  Tylenol (acetaminophen) OK to take if needed for pain or headache.  Follow instructions on the bottle    *  Prepare your body as instructed by the surgery clinic.  If instructed, Take a shower or bath the night before surgery. Use any special soaps or cleaning instructions according to instructions from your surgeon.  If you do not have soap from your surgeon, use your regular soap. Do not shave or scrub the surgery site.  Wear clean pajamas and have clean sheets on your bed     *  ON THE MORNING OF SURGERY:     --take all usual medications with small sip of water.      *  Resume all medications at the same doses after surgery, unless instructed otherwise by the medical staff.     *  Attend all follow up appointments with the surgeons (and/or therapists if applicable) as instructed.     *  Contact the surgeon's office for any specific questions about after-surgery cares and follow up instructions.        IF YOU REQUIRE NARCOTIC PAIN MEDICATION AFTER YOUR SURGERY:    --Take the narcotic pain medication exactly as prescribed, and use the absolute lowest dose needed for pain control.   The goal of pain medication is not complete pain relief, aim to just make it manageable.    --Beware of  drowsiness, nausea, vomiting, when taking this medication.  Do not drive, or operate dangerous equipment after taking this.    --The main side effects from narcotic pain medication can also include intestinal side effects including nausea, vomiting, constipation, or diarrhea.    --If your pain is not able to be controlled with the pain medication supplied to you, contact the surgeons because uncontrolled pain can be a sign of possible surgical complications.    --In case of constipation from pain medications, take over the counter Miralax powder or stool softner Senokot.  If you have a history of constipation with narcotic pain medication, consider taking Miralax powder on any day that you take a pain tablet.

## 2020-09-21 NOTE — PROGRESS NOTES
[address] [address]  Phone: [phone]  Primary Provider: Kassi Garcia  Pre-op Performing Provider: ELISHA PERES    PREOPERATIVE EVALUATION:  Today's date: 9/21/2020    Gale Willett is a 50 year old female who presents for a preoperative evaluation.    Surgical Information:  Surgery Details 9/21/2020   Surgery/Procedure: total laparoscopic hysterectomy, bilateral salpingectomy   Surgery Location: [facility]   Surgeon: Natasha Don   Surgery Date: 9/23/2020   Time of Surgery: 10:10AM   Where patient plans to recover: At home with family     Fax number for surgical facility: Note does not need to be faxed, will be available electronically in Epic.  Type of Anesthesia Anticipated: to be determined    Subjective     HPI related to upcoming procedure: Cervical dysplasia    Preop Questions 9/21/2020   1. Have you ever had a heart attack or stroke? No   2. Have you ever had surgery on your heart or blood vessels, such as a stent placement, a coronary artery bypass, or surgery on an artery in your head, neck, heart, or legs? No   3. Do you have chest pain with activity? No   4. Do you have a history of  heart failure? No   5. Do you currently have a cold, bronchitis or symptoms of other infection? No   6. Do you have a cough, shortness of breath, or wheezing? No   7. Do you or anyone in your family have previous history of blood clots? No   8. Do you or does anyone in your family have a serious bleeding problem such as prolonged bleeding following surgeries or cuts? No   9. Have you ever had problems with anemia or been told to take iron pills? No   10. Have you had any abnormal blood loss such as black, tarry or bloody stools, or abnormal vaginal bleeding? No   11. Have you ever had a blood transfusion? No   Are you willing to have a blood transfusion if it is medically needed before, during, or after your surgery? Yes   13.  Have you or any of your relatives ever had problems with anesthesia? No   14. Do you have sleep apnea, excessive snoring or daytime drowsiness? No   15. Do you have any artifical heart valves or other implanted medical devices like a pacemaker, defibrillator, or continuous glucose monitor? No   16. Do you have artificial joints? No   17. Are you allergic to latex? No   18. Is there any chance that you may be pregnant? No     Patient does not have a Health Care Directive or Living Will: Patient states has Advance Directive and will bring in a copy to clinic.    RX monitoring program ([place]) reviewed: N.A.    *  No recent infectious illnesses.    *  No recent cardiac or pulmonary issues or symptoms.    *  No problems performing vigorous physical activity, no changes in exercise tolerance.    *  No personal or family history of anesthesia complications.    *  No personal or family history of bleeding or clotting disorders.         Review of Systems  Constitutional, neuro, ENT, endocrine, pulmonary, cardiac, gastrointestinal, genitourinary, musculoskeletal, integument and psychiatric systems are negative, except as otherwise noted.    Patient Active Problem List    Diagnosis Date Noted     Cervical dysplasia 08/20/2020     Priority: Medium     Added automatically from request for surgery [id number]       Menorrhagia 08/20/2020     Priority: Medium     Added automatically from request for surgery [id number]       Severe dysplasia of cervix (SOPHIA III) 03/10/2020     Priority: Medium     10/23/14 NIL pap, + HR HPV (not 16/18). Cotest in 1 year  2/15/16 Dx pap NIL with Neg HPV. Plan: cotest in 3 years.   3/10/20 LSIL pap, + HR HPV (not 16/18). Plan colp  06/08/20 [place] Bx - SOPHIA 3, ECC - high grade, EMB - Negative. Plan LEEP. CCT Tracking.  08/14/20 LEEP BX: SOPHIA 3, margins positive. Plan hysterectomy  9/23/20 surgery scheduled         Memory loss 07/22/2016     Priority: Medium     Vitamin D insufficiency 01/14/2015     Priority:  Medium     High risk medications (not anticoagulants) long-term use 2015     Priority: Medium     Family history of osteoporosis 2015     Priority: Medium     Postsurgical hypothyroidism 2015     Priority: Medium     Routine general medical examination at a health care facility 10/23/2014     Priority: Medium     Family history of Alzheimer's disease 10/23/2014     Priority: Medium     Hypothyroidism 10/23/2014     Priority: Medium     History of thyroid cancer 10/23/2014     Priority: Medium     High risk HPV infection 10/23/2014     Priority: Medium      Past Medical History:   Diagnosis Date     Abnormal Pap smear of cervix 03/10/2020    see problem list     Cancer (H)     thyroid cancer- age 40 s/p partial thyroidectomy     High risk HPV infection 10/23/14, 3/10/20    Not HPV 16 or 18     PONV (postoperative nausea and vomiting)      Thyroid disease     hx thyroid cancer     Past Surgical History:   Procedure Laterality Date     BIOPSY  2012    Neck      SECTION       ENT SURGERY      partial thyroidectomy     Current Outpatient Medications   Medication Sig Dispense Refill     levothyroxine (SYNTHROID/LEVOTHROID) 100 MCG tablet Take 1 tablet (100 mcg) by mouth daily 90 tablet 0     Multiple Vitamins-Minerals (MULTIVITAMIN ADULT PO) Take 1 tablet by mouth daily         No Known Allergies     Social History     Tobacco Use     Smoking status: Never Smoker     Smokeless tobacco: Never Used   Substance Use Topics     Alcohol use: Yes     Alcohol/week: 0.0 standard drinks     Comment: 1-2 times per week (one glass of wine)     Family History   Problem Relation Age of Onset     Hypertension Mother      Alzheimer Disease Mother         under age 50     Hyperlipidemia Mother      Cerebrovascular Disease Mother      Hypertension Father      Hyperlipidemia Father      Obesity Father      Colon Cancer No family hx of      History   Drug Use No            Objective   /80   Pulse (!)  "48   Temp 97.5  F (36.4  C) (Temporal)   Ht 1.626 m (5' 4\")   Wt 62.9 kg (138 lb 11.2 oz)   LMP 08/23/2020   SpO2 100%   BMI 23.81 kg/m    Physical Exam    GENERAL alert and no distress  EYES:  Normal sclera,conjunctiva, EOMI  HENT: oral and posterior pharynx without lesions or erythema, facies symmetric  NECK: Neck supple. No LAD, without thyroidmegaly.  RESP: Clear to ausculation bilaterally without wheezes or crackles. Normal BS in all fields.  CV: RRR normal S1S2 without murmurs, rubs or gallops.  LYMPH: no cervical lymph adenopathy appreciated  MS: extremities- no gross deformities of the visible extremities noted,   EXT:  no lower extremity edema  PSYCH: Alert and oriented times 3; speech- coherent  SKIN:  No obvious significant skin lesions on visible portions of face     Recent Labs   Lab Test 03/10/20  0936 10/22/18  0913   HGB 13.2 13.2    175    139   POTASSIUM 4.5 4.7   CR 0.80 0.80        PRE-OP Diagnostics:  Labs pending at this time.  Results will be reviewed when available.    No EKG required, no history of coronary heart disease, significant arrhythmia, peripheral arterial disease or other structural heart disease.    Results for orders placed or performed in visit on 09/21/20   CBC with platelets     Status: Abnormal   Result Value Ref Range    WBC 3.8 (L) 4.0 - 11.0 10e9/L    RBC Count 4.76 3.8 - 5.2 10e12/L    Hemoglobin 14.0 11.7 - 15.7 g/dL    Hematocrit 43.0 35.0 - 47.0 %    MCV 90 78 - 100 fl    MCH 29.4 26.5 - 33.0 pg    MCHC 32.6 31.5 - 36.5 g/dL    RDW 13.0 10.0 - 15.0 %    Platelet Count 192 150 - 450 10e9/L   Basic metabolic panel     Status: Abnormal   Result Value Ref Range    Sodium 137 133 - 144 mmol/L    Potassium 4.6 3.4 - 5.3 mmol/L    Chloride 107 94 - 109 mmol/L    Carbon Dioxide 28 20 - 32 mmol/L    Anion Gap 2 (L) 3 - 14 mmol/L    Glucose 80 70 - 99 mg/dL    Urea Nitrogen 19 7 - 30 mg/dL    Creatinine 0.82 0.52 - 1.04 mg/dL    GFR Estimate 82 >60 " mL/min/[1.73_m2]    GFR Estimate If Black >90 >60 mL/min/[1.73_m2]    Calcium 9.3 8.5 - 10.1 mg/dL           Assessment & Plan   The proposed surgical procedure is considered INTERMEDIATE risk.    REVISED CARDIAC RISK INDEX  The patient has the following serious cardiovascular risks for perioperative complications:  No serious cardiac risks = 0 points    INTERPRETATION: 0 points: Class I (very low risk - 0.4% complication rate)       1. Cervix dysplasia    2. Preop general physical exam    3. Postsurgical hypothyroidism    4. Menopausal menorrhagia         The patient has the following additional risks and recommendations for perioperative complications:      CARDIOVASCULAR:  No specific cardiac symptoms or cardiac risk factors identified     PULMONARY:   No specific pulmonary risk factors identified.  No active pulmonary symptoms.       MEDICATION INSTRUCTIONS:  Patient is to take all scheduled medications on the day of surgery EXCEPT for modifications listed below:    Anticoagulant or Antiplatelet Medication Use  No ASA or NSAIDs within 7 days of surgery.  No fish oil or Vitamin E within 7 days of surgery.       RECOMMENDATION:  APPROVAL GIVEN to proceed with proposed procedure, without further diagnostic evaluation.    Return in about 6 months (around 3/21/2021) for Annual physical, Your Primary MD.    Signed Electronically by: Félix Carver MD    Copy of this evaluation report is provided to requesting physician.    [facility] Guidelines    Revised Cardiac Risk Index

## 2020-09-23 ENCOUNTER — ANESTHESIA EVENT (OUTPATIENT)
Dept: SURGERY | Facility: CLINIC | Age: 50
End: 2020-09-23
Payer: COMMERCIAL

## 2020-09-23 ENCOUNTER — ANESTHESIA (OUTPATIENT)
Dept: SURGERY | Facility: CLINIC | Age: 50
End: 2020-09-23
Payer: COMMERCIAL

## 2020-09-23 ENCOUNTER — HOSPITAL ENCOUNTER (OUTPATIENT)
Facility: CLINIC | Age: 50
Discharge: HOME | End: 2020-09-23
Attending: OBSTETRICS & GYNECOLOGY | Admitting: OBSTETRICS & GYNECOLOGY
Payer: COMMERCIAL

## 2020-09-23 VITALS
OXYGEN SATURATION: 98 % | SYSTOLIC BLOOD PRESSURE: 110 MMHG | HEART RATE: 44 BPM | TEMPERATURE: 97.3 F | RESPIRATION RATE: 16 BRPM | WEIGHT: 138.1 LBS | HEIGHT: 64 IN | BODY MASS INDEX: 23.58 KG/M2 | DIASTOLIC BLOOD PRESSURE: 66 MMHG

## 2020-09-23 DIAGNOSIS — N92.0 MENORRHAGIA: ICD-10-CM

## 2020-09-23 DIAGNOSIS — Z90.710 S/P HYSTERECTOMY: Primary | ICD-10-CM

## 2020-09-23 DIAGNOSIS — N87.9 CERVICAL DYSPLASIA: ICD-10-CM

## 2020-09-23 LAB — HCG UR QL: NEGATIVE

## 2020-09-23 PROCEDURE — 71000027 ZZH RECOVERY PHASE 2 EACH 15 MINS: Performed by: OBSTETRICS & GYNECOLOGY

## 2020-09-23 PROCEDURE — 25800030 ZZH RX IP 258 OP 636: Performed by: ANESTHESIOLOGY

## 2020-09-23 PROCEDURE — 88309 TISSUE EXAM BY PATHOLOGIST: CPT | Mod: 26 | Performed by: OBSTETRICS & GYNECOLOGY

## 2020-09-23 PROCEDURE — 36000063 ZZH SURGERY LEVEL 4 EA 15 ADDTL MIN: Performed by: OBSTETRICS & GYNECOLOGY

## 2020-09-23 PROCEDURE — 71000013 ZZH RECOVERY PHASE 1 LEVEL 1 EA ADDTL HR: Performed by: OBSTETRICS & GYNECOLOGY

## 2020-09-23 PROCEDURE — 40000306 ZZH STATISTIC PRE PROC ASSESS II: Performed by: OBSTETRICS & GYNECOLOGY

## 2020-09-23 PROCEDURE — 25000128 H RX IP 250 OP 636: Performed by: ANESTHESIOLOGY

## 2020-09-23 PROCEDURE — 27210794 ZZH OR GENERAL SUPPLY STERILE: Performed by: OBSTETRICS & GYNECOLOGY

## 2020-09-23 PROCEDURE — 25000125 ZZHC RX 250: Performed by: ANESTHESIOLOGY

## 2020-09-23 PROCEDURE — 25000125 ZZHC RX 250: Performed by: NURSE ANESTHETIST, CERTIFIED REGISTERED

## 2020-09-23 PROCEDURE — 71000012 ZZH RECOVERY PHASE 1 LEVEL 1 FIRST HR: Performed by: OBSTETRICS & GYNECOLOGY

## 2020-09-23 PROCEDURE — 36000093 ZZH SURGERY LEVEL 4 1ST 30 MIN: Performed by: OBSTETRICS & GYNECOLOGY

## 2020-09-23 PROCEDURE — 25000128 H RX IP 250 OP 636: Performed by: OBSTETRICS & GYNECOLOGY

## 2020-09-23 PROCEDURE — 25000128 H RX IP 250 OP 636: Performed by: NURSE ANESTHETIST, CERTIFIED REGISTERED

## 2020-09-23 PROCEDURE — 37000009 ZZH ANESTHESIA TECHNICAL FEE, EACH ADDTL 15 MIN: Performed by: OBSTETRICS & GYNECOLOGY

## 2020-09-23 PROCEDURE — 25000132 ZZH RX MED GY IP 250 OP 250 PS 637: Performed by: OBSTETRICS & GYNECOLOGY

## 2020-09-23 PROCEDURE — 88309 TISSUE EXAM BY PATHOLOGIST: CPT | Performed by: OBSTETRICS & GYNECOLOGY

## 2020-09-23 PROCEDURE — 81025 URINE PREGNANCY TEST: CPT | Performed by: OBSTETRICS & GYNECOLOGY

## 2020-09-23 PROCEDURE — 25800025 ZZH RX 258: Performed by: OBSTETRICS & GYNECOLOGY

## 2020-09-23 PROCEDURE — 25000132 ZZH RX MED GY IP 250 OP 250 PS 637: Performed by: ANESTHESIOLOGY

## 2020-09-23 PROCEDURE — 58571 TLH W/T/O 250 G OR LESS: CPT | Performed by: OBSTETRICS & GYNECOLOGY

## 2020-09-23 PROCEDURE — 25800030 ZZH RX IP 258 OP 636: Performed by: NURSE ANESTHETIST, CERTIFIED REGISTERED

## 2020-09-23 PROCEDURE — 37000008 ZZH ANESTHESIA TECHNICAL FEE, 1ST 30 MIN: Performed by: OBSTETRICS & GYNECOLOGY

## 2020-09-23 RX ORDER — CEFAZOLIN SODIUM 1 G/3ML
1 INJECTION, POWDER, FOR SOLUTION INTRAMUSCULAR; INTRAVENOUS SEE ADMIN INSTRUCTIONS
Status: DISCONTINUED | OUTPATIENT
Start: 2020-09-23 | End: 2020-09-23

## 2020-09-23 RX ORDER — MEPERIDINE HYDROCHLORIDE 25 MG/ML
12.5 INJECTION INTRAMUSCULAR; INTRAVENOUS; SUBCUTANEOUS
Status: DISCONTINUED | OUTPATIENT
Start: 2020-09-23 | End: 2020-09-23 | Stop reason: HOSPADM

## 2020-09-23 RX ORDER — DEXAMETHASONE SODIUM PHOSPHATE 4 MG/ML
INJECTION, SOLUTION INTRA-ARTICULAR; INTRALESIONAL; INTRAMUSCULAR; INTRAVENOUS; SOFT TISSUE PRN
Status: DISCONTINUED | OUTPATIENT
Start: 2020-09-23 | End: 2020-09-23

## 2020-09-23 RX ORDER — HYDROCODONE BITARTRATE AND ACETAMINOPHEN 5; 325 MG/1; MG/1
1-2 TABLET ORAL EVERY 4 HOURS PRN
Qty: 15 TABLET | Refills: 0 | Status: SHIPPED | OUTPATIENT
Start: 2020-09-23 | End: 2020-11-02

## 2020-09-23 RX ORDER — PROPOFOL 10 MG/ML
INJECTION, EMULSION INTRAVENOUS CONTINUOUS PRN
Status: DISCONTINUED | OUTPATIENT
Start: 2020-09-23 | End: 2020-09-23

## 2020-09-23 RX ORDER — CEFAZOLIN SODIUM 2 G/100ML
2 INJECTION, SOLUTION INTRAVENOUS
Status: DISCONTINUED | OUTPATIENT
Start: 2020-09-23 | End: 2020-09-23

## 2020-09-23 RX ORDER — ONDANSETRON 2 MG/ML
4 INJECTION INTRAMUSCULAR; INTRAVENOUS EVERY 30 MIN PRN
Status: DISCONTINUED | OUTPATIENT
Start: 2020-09-23 | End: 2020-09-23 | Stop reason: HOSPADM

## 2020-09-23 RX ORDER — FENTANYL CITRATE 50 UG/ML
INJECTION, SOLUTION INTRAMUSCULAR; INTRAVENOUS PRN
Status: DISCONTINUED | OUTPATIENT
Start: 2020-09-23 | End: 2020-09-23

## 2020-09-23 RX ORDER — ACETAMINOPHEN 325 MG/1
975 TABLET ORAL ONCE
Status: COMPLETED | OUTPATIENT
Start: 2020-09-23 | End: 2020-09-23

## 2020-09-23 RX ORDER — CEFAZOLIN SODIUM 2 G/100ML
2 INJECTION, SOLUTION INTRAVENOUS
Status: COMPLETED | OUTPATIENT
Start: 2020-09-23 | End: 2020-09-23

## 2020-09-23 RX ORDER — FENTANYL CITRATE 50 UG/ML
25-50 INJECTION, SOLUTION INTRAMUSCULAR; INTRAVENOUS
Status: DISCONTINUED | OUTPATIENT
Start: 2020-09-23 | End: 2020-09-23 | Stop reason: HOSPADM

## 2020-09-23 RX ORDER — NALOXONE HYDROCHLORIDE 0.4 MG/ML
.1-.4 INJECTION, SOLUTION INTRAMUSCULAR; INTRAVENOUS; SUBCUTANEOUS
Status: DISCONTINUED | OUTPATIENT
Start: 2020-09-23 | End: 2020-09-23 | Stop reason: HOSPADM

## 2020-09-23 RX ORDER — IBUPROFEN 600 MG/1
600 TABLET, FILM COATED ORAL
Status: DISCONTINUED | OUTPATIENT
Start: 2020-09-23 | End: 2020-09-23 | Stop reason: HOSPADM

## 2020-09-23 RX ORDER — ONDANSETRON 4 MG/1
4 TABLET, ORALLY DISINTEGRATING ORAL EVERY 30 MIN PRN
Status: DISCONTINUED | OUTPATIENT
Start: 2020-09-23 | End: 2020-09-23 | Stop reason: HOSPADM

## 2020-09-23 RX ORDER — KETAMINE HYDROCHLORIDE 10 MG/ML
INJECTION INTRAMUSCULAR; INTRAVENOUS PRN
Status: DISCONTINUED | OUTPATIENT
Start: 2020-09-23 | End: 2020-09-23

## 2020-09-23 RX ORDER — HYDROMORPHONE HYDROCHLORIDE 1 MG/ML
.3-.5 INJECTION, SOLUTION INTRAMUSCULAR; INTRAVENOUS; SUBCUTANEOUS EVERY 10 MIN PRN
Status: DISCONTINUED | OUTPATIENT
Start: 2020-09-23 | End: 2020-09-23 | Stop reason: HOSPADM

## 2020-09-23 RX ORDER — SODIUM CHLORIDE, SODIUM LACTATE, POTASSIUM CHLORIDE, CALCIUM CHLORIDE 600; 310; 30; 20 MG/100ML; MG/100ML; MG/100ML; MG/100ML
INJECTION, SOLUTION INTRAVENOUS CONTINUOUS
Status: DISCONTINUED | OUTPATIENT
Start: 2020-09-23 | End: 2020-09-23 | Stop reason: HOSPADM

## 2020-09-23 RX ORDER — PHENAZOPYRIDINE HYDROCHLORIDE 200 MG/1
200 TABLET, FILM COATED ORAL ONCE
Status: COMPLETED | OUTPATIENT
Start: 2020-09-23 | End: 2020-09-23

## 2020-09-23 RX ORDER — EPHEDRINE SULFATE 50 MG/ML
INJECTION, SOLUTION INTRAMUSCULAR; INTRAVENOUS; SUBCUTANEOUS PRN
Status: DISCONTINUED | OUTPATIENT
Start: 2020-09-23 | End: 2020-09-23

## 2020-09-23 RX ORDER — GLYCOPYRROLATE 0.2 MG/ML
INJECTION, SOLUTION INTRAMUSCULAR; INTRAVENOUS PRN
Status: DISCONTINUED | OUTPATIENT
Start: 2020-09-23 | End: 2020-09-23

## 2020-09-23 RX ORDER — LIDOCAINE HYDROCHLORIDE 10 MG/ML
INJECTION, SOLUTION INFILTRATION; PERINEURAL PRN
Status: DISCONTINUED | OUTPATIENT
Start: 2020-09-23 | End: 2020-09-23

## 2020-09-23 RX ORDER — CEFAZOLIN SODIUM 1 G/3ML
1 INJECTION, POWDER, FOR SOLUTION INTRAMUSCULAR; INTRAVENOUS SEE ADMIN INSTRUCTIONS
Status: DISCONTINUED | OUTPATIENT
Start: 2020-09-23 | End: 2020-09-23 | Stop reason: HOSPADM

## 2020-09-23 RX ORDER — PROPOFOL 10 MG/ML
INJECTION, EMULSION INTRAVENOUS PRN
Status: DISCONTINUED | OUTPATIENT
Start: 2020-09-23 | End: 2020-09-23

## 2020-09-23 RX ORDER — PHENAZOPYRIDINE HYDROCHLORIDE 200 MG/1
200 TABLET, FILM COATED ORAL ONCE
Status: DISCONTINUED | OUTPATIENT
Start: 2020-09-23 | End: 2020-09-23

## 2020-09-23 RX ORDER — SCOLOPAMINE TRANSDERMAL SYSTEM 1 MG/1
1 PATCH, EXTENDED RELEASE TRANSDERMAL
Status: DISCONTINUED | OUTPATIENT
Start: 2020-09-23 | End: 2020-09-23

## 2020-09-23 RX ORDER — LABETALOL 20 MG/4 ML (5 MG/ML) INTRAVENOUS SYRINGE
10
Status: DISCONTINUED | OUTPATIENT
Start: 2020-09-23 | End: 2020-09-23 | Stop reason: HOSPADM

## 2020-09-23 RX ORDER — IBUPROFEN 600 MG/1
600 TABLET, FILM COATED ORAL EVERY 6 HOURS PRN
Qty: 30 TABLET | Refills: 1 | Status: SHIPPED | OUTPATIENT
Start: 2020-09-23 | End: 2020-11-02

## 2020-09-23 RX ORDER — ONDANSETRON 2 MG/ML
INJECTION INTRAMUSCULAR; INTRAVENOUS PRN
Status: DISCONTINUED | OUTPATIENT
Start: 2020-09-23 | End: 2020-09-23

## 2020-09-23 RX ORDER — LIDOCAINE 40 MG/G
CREAM TOPICAL
Status: DISCONTINUED | OUTPATIENT
Start: 2020-09-23 | End: 2020-09-23 | Stop reason: HOSPADM

## 2020-09-23 RX ORDER — HYDROCODONE BITARTRATE AND ACETAMINOPHEN 5; 325 MG/1; MG/1
1 TABLET ORAL
Status: DISCONTINUED | OUTPATIENT
Start: 2020-09-23 | End: 2020-09-23 | Stop reason: HOSPADM

## 2020-09-23 RX ORDER — NEOSTIGMINE METHYLSULFATE 1 MG/ML
VIAL (ML) INJECTION PRN
Status: DISCONTINUED | OUTPATIENT
Start: 2020-09-23 | End: 2020-09-23

## 2020-09-23 RX ORDER — BUPIVACAINE HYDROCHLORIDE 2.5 MG/ML
INJECTION, SOLUTION EPIDURAL; INFILTRATION; INTRACAUDAL PRN
Status: DISCONTINUED | OUTPATIENT
Start: 2020-09-23 | End: 2020-09-23 | Stop reason: HOSPADM

## 2020-09-23 RX ORDER — SCOLOPAMINE TRANSDERMAL SYSTEM 1 MG/1
1 PATCH, EXTENDED RELEASE TRANSDERMAL
Status: DISCONTINUED | OUTPATIENT
Start: 2020-09-23 | End: 2020-09-23 | Stop reason: HOSPADM

## 2020-09-23 RX ADMIN — FENTANYL CITRATE 50 MCG: 0.05 INJECTION, SOLUTION INTRAMUSCULAR; INTRAVENOUS at 15:02

## 2020-09-23 RX ADMIN — PROPOFOL 60 MCG/KG/MIN: 10 INJECTION, EMULSION INTRAVENOUS at 11:28

## 2020-09-23 RX ADMIN — PHENAZOPYRIDINE 200 MG: 200 TABLET ORAL at 09:07

## 2020-09-23 RX ADMIN — GLYCOPYRROLATE 0.1 MG: 0.2 INJECTION, SOLUTION INTRAMUSCULAR; INTRAVENOUS at 13:23

## 2020-09-23 RX ADMIN — ONDANSETRON HYDROCHLORIDE 4 MG: 2 INJECTION, SOLUTION INTRAVENOUS at 12:44

## 2020-09-23 RX ADMIN — Medication 7.5 MG: at 12:53

## 2020-09-23 RX ADMIN — HYDROMORPHONE HYDROCHLORIDE 1 MG: 1 INJECTION, SOLUTION INTRAMUSCULAR; INTRAVENOUS; SUBCUTANEOUS at 11:51

## 2020-09-23 RX ADMIN — Medication 30 MG: at 11:45

## 2020-09-23 RX ADMIN — ACETAMINOPHEN 975 MG: 325 TABLET, FILM COATED ORAL at 15:04

## 2020-09-23 RX ADMIN — Medication 3 MG: at 13:10

## 2020-09-23 RX ADMIN — SODIUM CHLORIDE, POTASSIUM CHLORIDE, SODIUM LACTATE AND CALCIUM CHLORIDE: 600; 310; 30; 20 INJECTION, SOLUTION INTRAVENOUS at 13:38

## 2020-09-23 RX ADMIN — Medication 10 MG: at 12:45

## 2020-09-23 RX ADMIN — PROPOFOL 200 MG: 10 INJECTION, EMULSION INTRAVENOUS at 11:21

## 2020-09-23 RX ADMIN — SODIUM CHLORIDE, POTASSIUM CHLORIDE, SODIUM LACTATE AND CALCIUM CHLORIDE: 600; 310; 30; 20 INJECTION, SOLUTION INTRAVENOUS at 10:00

## 2020-09-23 RX ADMIN — ROCURONIUM BROMIDE 10 MG: 10 INJECTION INTRAVENOUS at 12:06

## 2020-09-23 RX ADMIN — MIDAZOLAM 2 MG: 1 INJECTION INTRAMUSCULAR; INTRAVENOUS at 11:14

## 2020-09-23 RX ADMIN — CEFAZOLIN SODIUM 2 G: 2 INJECTION, SOLUTION INTRAVENOUS at 11:29

## 2020-09-23 RX ADMIN — DEXMEDETOMIDINE HYDROCHLORIDE 0.4 MCG/KG/HR: 100 INJECTION, SOLUTION INTRAVENOUS at 11:28

## 2020-09-23 RX ADMIN — GLYCOPYRROLATE 0.2 MG: 0.2 INJECTION, SOLUTION INTRAMUSCULAR; INTRAVENOUS at 11:21

## 2020-09-23 RX ADMIN — DEXAMETHASONE SODIUM PHOSPHATE 4 MG: 4 INJECTION, SOLUTION INTRA-ARTICULAR; INTRALESIONAL; INTRAMUSCULAR; INTRAVENOUS; SOFT TISSUE at 11:21

## 2020-09-23 RX ADMIN — ROCURONIUM BROMIDE 50 MG: 10 INJECTION INTRAVENOUS at 11:21

## 2020-09-23 RX ADMIN — GLYCOPYRROLATE 0.6 MG: 0.2 INJECTION, SOLUTION INTRAMUSCULAR; INTRAVENOUS at 13:10

## 2020-09-23 RX ADMIN — SCOPALAMINE 1 PATCH: 1 PATCH, EXTENDED RELEASE TRANSDERMAL at 09:23

## 2020-09-23 RX ADMIN — FENTANYL CITRATE 100 MCG: 50 INJECTION, SOLUTION INTRAMUSCULAR; INTRAVENOUS at 11:21

## 2020-09-23 RX ADMIN — LIDOCAINE HYDROCHLORIDE 30 MG: 10 INJECTION, SOLUTION INFILTRATION; PERINEURAL at 11:21

## 2020-09-23 SDOH — HEALTH STABILITY: MENTAL HEALTH: CURRENT SMOKER: 0

## 2020-09-23 ASSESSMENT — MIFFLIN-ST. JEOR: SCORE: 1226.42

## 2020-09-23 NOTE — OR NURSING
Patient has been up to void X2, has urinated small amts, Bladder scan shows 233 ml at 1700, patient wants to go home without a flores, instructions were given that if she can't void and has pressure she needs to go to the ER, patient and  agree.

## 2020-09-23 NOTE — ANESTHESIA CARE TRANSFER NOTE
Patient: Gale Willett    Procedure(s):  TOTAL LAPAROSCOPIC HYSTERECTOMY, BILATERAL SALPINGECTOMY    Diagnosis: Cervical dysplasia [N87.9]  Menorrhagia [N92.0]  Diagnosis Additional Information: No value filed.    Anesthesia Type:   General     Note:  Airway :ETT  Patient transferred to:PACU  Comments: Adequate spontaneous respirations. VSS.Handoff Report: Identifed the Patient, Identified the Reponsible Provider, Reviewed the pertinent medical history, Discussed the surgical course, Reviewed Intra-OP anesthesia mangement and issues during anesthesia, Set expectations for post-procedure period and Allowed opportunity for questions and acknowledgement of understanding      Vitals: (Last set prior to Anesthesia Care Transfer)    CRNA VITALS  9/23/2020 1256 - 9/23/2020 1331      9/23/2020             Pulse:  52    SpO2:  100 %    Resp Rate (observed):  (!) 6                Electronically Signed By: ADAM George CRNA  September 23, 2020  1:31 PM

## 2020-09-23 NOTE — TELEPHONE ENCOUNTER
Anion gap is not concerning when is is slightly below normal range.  It is concerning with increased anion gap above the normal range.

## 2020-09-23 NOTE — OP NOTE
Operative Note    Patient: Gale Willett  : 1970  MRN: [id number]    Date of Service: 2020    Pre-operative diagnosis:  Abnormal uterine bleeding  SOPHIA 3, s/p LEEP with positive margins    Post-operative diagnosis:  Same     Procedure:   Total laparoscopic hysterectomy  Bilateral salpingectomy    Surgeon: Natasha Don MD  Assistant: Chiquita Freitas CST     Anesthesia: General    EBL: 50cc  Urine: 200cc  Fluids: 600cc    Specimens: uterus and cervix, bilateral fallopian tubes  Complications: none apparent    Findings: small uterus with one fibroid, normal appearing bilateral tubes and ovaries, normal upper abdomen    Indications: Gale Willett is a 50 year old female with a history of abnormal uterine bleeding who desired definitive management with hysterectomy.  She also had a history of SOPHIA 3 s/p LEEP and margins were positive.  Discussed risks, benefits, and alternatives to the procedure including risk of infection, bleeding, damage to local organs, blood clots. The patient's questions were answered, understanding confirmed, and the patient signed written informed consent.    Procedure: The patient was taken to the operating room where she underwent anesthesia and was prepped and draped in the usual sterile fashion in dorsal lithotomy position with arms tucked at sides.  She received pre-op antibiotics.  A time-out was performed.  A flores catheter was placed.  A speculum was inserted, the cervix was dilated, and a uterine manipulator was placed in through the cervix. Attention was then turned to the abdomen where 0.25% marcaine with epinephrine was used to infiltrate the inferior aspect of the umbilicus. An 11-blade scalpel was used to make a 5 mm vertical incision in the umbilicus.  A 5mm visi-port was used to enter into the peritoneal space under direct visualization.  CO2 gas was attached to the port and opening pressure was less than 5 mmHg, and flow was increased to high flow.  Pneumoperitoneum was achieved with good tympany of the abdomen.  The 5 mm scope was placed in the port and visualized the abdomen which was free of any injury. A 5 mm port was placed in the RLQ with similar technique and under direct visualization.  A 5mm port was placed in the LLQ of the abdomen with similar technique under visualization.  A blunt probe was used to manipulate the ovaries and uterus for visualization of the entire pelvis. Findings as above. Pictures were taken.      Attention was turned to the right fallopian tube.  The tube was elevated and dissected away from the ovary using the ligasure device.  The round ligament was grasped, cauterized and transected. The utero-ovarian ligament was dissected using the ligasure.  The posterior leaf of the broad ligament was skeletonized down to the level of the uterine artery. Attention was then turned to the left fallopian tube which in similar fashion was dissected, round ligament cauterized and cut, utero-ovarian ligament cauterized and cut, and posterior leaf of broad ligament skeletonized to level of uterine artery. A bladder flap was created from the round ligament on the right to the round ligament on the left,  the tissue with blunt dissection and cauterizing the remaining perforating vessels. The left ovary was somewhat adherent to the mesosalpinx on the left side, and care was taken to dissect away the tube while leaving the IP ligament intact.  The ovary was inspected at the end of the procedure and noted to have good coloring demonstrative of adequate blood supply.     The uterine artery on the right side was then coagulated and divided, followed by the parametrial tissue, coagulated and divided down to the level of the cervicovaginal junction. This was repeated on the left side. A posterior colpotomy was made with the hook cautery and the V care cup identified. The incision carried to the lateral aspect of the uterus on both sides. The  colpotomy was then extended around the right anterior side of the vagina over to the left anterior, and the uterus amputated. The uterus and fallopian tubes were removed from the vagina and sent to pathology.      Attention was turned to the vagina where the vaginal cuff was closed from below with 0-vicryl suture, running anterior to posterior.  The vaginal cuff was inspected and noted to be hemostatic.    A final visual sweep of the pelvis from above showed no further pathology.  Surgicell powder was placed over the pelvic floor for excellent hemostasis.  The ports were removed and the pneumoperitoneum was expelled. The skin incisions were closed using 4-0 vicryl in a subcuticular fashion.     Instrument, needle, and sponge counts were correct times 2.  The patient was extubated in the OR and transferred to the PACU in stable condition.    The assistance of the co-surgeon was required due to the need for additional skilled surgical hands to retract and hold instruments due to the nature of the surgical procedure and risk of complications.    Natasha Don MD, MPH  [facility]

## 2020-09-23 NOTE — ANESTHESIA POSTPROCEDURE EVALUATION
Patient: Gale Willett    Procedure(s):  TOTAL LAPAROSCOPIC HYSTERECTOMY, BILATERAL SALPINGECTOMY    Diagnosis:Cervical dysplasia [N87.9]  Menorrhagia [N92.0]  Diagnosis Additional Information: No value filed.    Anesthesia Type:  General    Note:  Anesthesia Post Evaluation    Patient location during evaluation: PACU  Patient participation: Able to fully participate in evaluation  Level of consciousness: awake and alert  Pain management: adequate  multimodal analgesia used between 6 hours prior to anesthesia start to PACU dischargeAirway patency: patent  Cardiovascular status: acceptable  Respiratory status: acceptable  two or more mitigation strategies used for obstructive sleep apneaHydration status: acceptable  PONV: none     Anesthetic complications: None          Last vitals:  Vitals:    09/23/20 1400 09/23/20 1415 09/23/20 1430   BP: 117/73 120/75 128/76   Pulse: (!) 43 (!) 40 (!) 41   Resp: 11 10 12   Temp:   97.3  F (36.3  C)   SpO2: 100% 100% 100%         Electronically Signed By: Christian Mulligan MD  September 23, 2020  2:42 PM

## 2020-09-23 NOTE — DISCHARGE INSTRUCTIONS
"DR. ZACK SALAZAR M.D.          CLINIC PHONE NUMBER:  [phone]  [facility]    TOOK 975 mg OF TYLENOL AT 3 PM. MAXIMUM AMOUNT IN 24 HOURS = 4,000 MG , next dose is due at 9:00 pm, this needs to be taken every 6 hours around the clock for the next 2-3 days          You had a medicine to visualize your ureters during surgery. This medication will discolor your urine,appearing orange/rust in color. This is NORMAL. It also may stain your undergarments. We recommend using a panti-liner and or panties that are not \"newer/favorites\"  Your urine should clear to yellow within 24 hours.    GENERAL ANESTHESIA OR SEDATION ADULT DISCHARGE INSTRUCTIONS   SPECIAL PRECAUTIONS FOR 24 HOURS AFTER SURGERY    IT IS NOT UNUSUAL TO FEEL LIGHT-HEADED OR FAINT, UP TO 24 HOURS AFTER SURGERY OR WHILE TAKING PAIN MEDICATION.  IF YOU HAVE THESE SYMPTOMS; SIT FOR A FEW MINUTES BEFORE STANDING AND HAVE SOMEONE ASSIST YOU WHEN YOU GET UP TO WALK OR USE THE BATHROOM.    YOU SHOULD REST AND RELAX FOR THE NEXT 24 HOURS AND YOU MUST MAKE ARRANGEMENTS TO HAVE SOMEONE STAY WITH YOU FOR AT LEAST 24 HOURS AFTER YOUR DISCHARGE.  AVOID HAZARDOUS AND STRENUOUS ACTIVITIES.  DO NOT MAKE IMPORTANT DECISIONS FOR 24 HOURS.    DO NOT DRIVE ANY VEHICLE OR OPERATE MECHANICAL EQUIPMENT FOR 24 HOURS FOLLOWING THE END OF YOUR SURGERY.  EVEN THOUGH YOU MAY FEEL NORMAL, YOUR REACTIONS MAY BE AFFECTED BY THE MEDICATION YOU HAVE RECEIVED.    DO NOT DRINK ALCOHOLIC BEVERAGES FOR 24 HOURS FOLLOWING YOUR SURGERY.    DRINK CLEAR LIQUIDS (APPLE JUICE, GINGER ALE, 7-UP, BROTH, ETC.).  PROGRESS TO YOUR REGULAR DIET AS YOU FEEL ABLE.    YOU MAY HAVE A DRY MOUTH, A SORE THROAT, MUSCLES ACHES OR TROUBLE SLEEPING.  THESE SHOULD GO AWAY AFTER 24 HOURS.    CALL YOUR DOCTOR FOR ANY OF THE FOLLOWING:  SIGNS OF INFECTION (FEVER, GROWING TENDERNESS AT THE SURGERY SITE, A LARGE AMOUNT OF DRAINAGE OR BLEEDING, SEVERE PAIN, FOUL-SMELLING DRAINAGE, REDNESS OR " SWELLING.    IT HAS BEEN OVER 8 TO 10 HOURS SINCE SURGERY AND YOU ARE STILL NOT ABLE TO URINATE (PASS WATER).     Removal of Transderm Scop Patch:  To remove the patch simply peel it off your skin.  Be sure to wash your hands and the area behind your ear thoroughly with soap and water.  The Transderm Scop Patch will continue to have some active ingredient after use.  Therefore, to avoid accidental contact or ingestion by children or pets, fold the used patch in half with the sticky side together and dispose in the trash out of reach of children and pets.  If you wear contact lens, be sure to wash your hands first before handling contact lens.      Removal date:9/24/2020 at 9:30am.

## 2020-09-23 NOTE — ANESTHESIA PREPROCEDURE EVALUATION
Anesthesia Pre-Procedure Evaluation    Patient: Gale Willett   MRN: [id number] : 1970          Preoperative Diagnosis: Cervical dysplasia [N87.9]  Menorrhagia [N92.0]    Procedure(s):  TOTAL LAPAROSCOPIC HYSTERECTOMY, BILATERAL SALPINGECTOMY    Past Medical History:   Diagnosis Date     Abnormal Pap smear of cervix 03/10/2020    see problem list     Cancer (H)     thyroid cancer- age 40 s/p partial thyroidectomy     High risk HPV infection 10/23/14, 3/10/20    Not HPV 16 or 18     PONV (postoperative nausea and vomiting)      Thyroid disease     hx thyroid cancer     Past Surgical History:   Procedure Laterality Date     BIOPSY  2012    Neck      SECTION       ENT SURGERY  2009    partial thyroidectomy     Anesthesia Evaluation     . Pt has had prior anesthetic.     History of anesthetic complications   - PONV        ROS/MED HX    ENT/Pulmonary:  - neg pulmonary ROS     Neurologic:  - neg neurologic ROS     Cardiovascular:  - neg cardiovascular ROS       METS/Exercise Tolerance:     Hematologic:  - neg hematologic  ROS       Musculoskeletal:  - neg musculoskeletal ROS       GI/Hepatic:  - neg GI/hepatic ROS       Renal/Genitourinary:  - ROS Renal section negative       Endo:     (+) thyroid problem hypothyroidism, .      Psychiatric:  - neg psychiatric ROS       Infectious Disease:  - neg infectious disease ROS       Malignancy:         Other:    - neg other ROS                      Physical Exam  Normal systems: cardiovascular, pulmonary and dental    Airway   Mallampati: II  TM distance: >3 FB  Neck ROM: full    Dental     Cardiovascular       Pulmonary             Lab Results   Component Value Date    WBC 3.8 (L) 2020    HGB 14.0 2020    HCT 43.0 2020     2020     2020    POTASSIUM 4.6 2020    CHLORIDE 107 2020    CO2 28 2020    BUN 19 2020    CR 0.82 2020    GLC 80 2020    STEPHAN 9.3 2020    ALBUMIN 3.7  "03/10/2020    PROTTOTAL 7.7 03/10/2020    ALT 20 03/10/2020    AST 18 03/10/2020    ALKPHOS 54 03/10/2020    BILITOTAL 0.7 03/10/2020    TSH 0.16 (L) 09/21/2020    T4 1.39 09/21/2020    T3 82 10/22/2018       Preop Vitals  BP Readings from Last 3 Encounters:   09/21/20 120/80   08/14/20 120/80   08/07/20 118/80    Pulse Readings from Last 3 Encounters:   09/21/20 (!) 48   07/01/20 (!) 48   03/10/20 69      Resp Readings from Last 3 Encounters:   07/01/20 16   03/10/20 12   02/15/16 16    SpO2 Readings from Last 3 Encounters:   09/21/20 100%   07/01/20 97%   03/10/20 97%      Temp Readings from Last 1 Encounters:   09/21/20 97.5  F (36.4  C) (Temporal)    Ht Readings from Last 1 Encounters:   09/18/20 1.626 m (5' 4\")      Wt Readings from Last 1 Encounters:   09/18/20 59 kg (130 lb)    Estimated body mass index is 22.31 kg/m  as calculated from the following:    Height as of this encounter: 1.626 m (5' 4\").    Weight as of this encounter: 59 kg (130 lb).       Anesthesia Plan      History & Physical Review  History and physical reviewed and following examination; no interval change.    ASA Status:  2 .    NPO Status:  > 8 hours    Plan for General with Intravenous induction. Maintenance will be Balanced.    PONV prophylaxis:  Ondansetron (or other 5HT-3) and Dexamethasone or Solumedrol    The patient is not a current smoker      Postoperative Care  Postoperative pain management:  IV analgesics, Oral pain medications and Multi-modal analgesia.      Consents  Anesthetic plan, risks, benefits and alternatives discussed with:  Patient..                 Christian Mulligan MD                    .  "

## 2020-09-24 LAB — COPATH REPORT: NORMAL

## 2020-09-25 ENCOUNTER — MYC MEDICAL ADVICE (OUTPATIENT)
Dept: OBGYN | Facility: CLINIC | Age: 50
End: 2020-09-25

## 2020-11-02 ENCOUNTER — OFFICE VISIT (OUTPATIENT)
Dept: OBGYN | Facility: CLINIC | Age: 50
End: 2020-11-02
Payer: COMMERCIAL

## 2020-11-02 VITALS
BODY MASS INDEX: 23.22 KG/M2 | HEIGHT: 64 IN | SYSTOLIC BLOOD PRESSURE: 100 MMHG | DIASTOLIC BLOOD PRESSURE: 60 MMHG | WEIGHT: 136 LBS

## 2020-11-02 DIAGNOSIS — Z90.710 S/P HYSTERECTOMY: Primary | ICD-10-CM

## 2020-11-02 DIAGNOSIS — Z23 NEED FOR PROPHYLACTIC VACCINATION AND INOCULATION AGAINST INFLUENZA: ICD-10-CM

## 2020-11-02 DIAGNOSIS — D06.9 SEVERE DYSPLASIA OF CERVIX (CIN III): ICD-10-CM

## 2020-11-02 PROCEDURE — 99024 POSTOP FOLLOW-UP VISIT: CPT | Performed by: OBSTETRICS & GYNECOLOGY

## 2020-11-02 PROCEDURE — 90682 RIV4 VACC RECOMBINANT DNA IM: CPT | Performed by: OBSTETRICS & GYNECOLOGY

## 2020-11-02 PROCEDURE — 90471 IMMUNIZATION ADMIN: CPT | Performed by: OBSTETRICS & GYNECOLOGY

## 2020-11-02 ASSESSMENT — MIFFLIN-ST. JEOR: SCORE: 1216.89

## 2020-11-02 NOTE — PROGRESS NOTES
"  SUBJECTIVE:                                                   CC:  Patient presents with:  Post-op Visit: [facility] 9/23  Imm/Inj: Flu Shot      HPI:  Gale Willett is a 50 year old female s/p TLH BS on 9/23 who presents for a post op visit.      Since the surgery, she is doing well.  Has some small amount of oozing.  Never needed narcotic pain meds.  Felt bloated afterward but this is gone now       OBJECTIVE:     /60 (BP Location: Left arm, Patient Position: Chair, Cuff Size: Adult Regular)   Ht 1.626 m (5' 4\")   Wt 61.7 kg (136 lb)   Breastfeeding No   BMI 23.34 kg/m      Gen: Healthy appearing thin female, no acute distress, comfortable  Incisions: clean/dry/intact, no s/s of infection  SSE:  Two areas of suture material visible with knots, removed and the area cauterized with silver nitrate  SVE: cuff intact    Test Results:  Pathology:  SPECIMEN(S):   Uterus, cervix, bilateral fallopian tubes     FINAL DIAGNOSIS:   Uterus, resection:   - Cervix: Negative for residual dysplasia.  Ectocervical margin without   abnormalities.   Additional cervical findings: Changes of prior biopsy site, chronic   cervicitis, tubal metaplasia and tunnel   clusters.   - Inactive to proliferative pattern endometrium.   - Adenomyosis.   - Leiomyomas.   - Unremarkable serosa.   - Negative for hyperplasia and malignancy.     Fallopian tubes, bilateral, resection:   - Negative for preneoplastic and neoplastic processes.     ASSESSMENT/PLAN:                                                      1. Need for prophylactic vaccination and inoculation against influenza  - INFLUENZA QUAD, RECOMBINANT, P-FREE (RIV4) (FLUBLOCK) [07745]    2. S/P hysterectomy  Gave printed copy of pathology results and discussed with the patient.   Plan for 2 more weeks of pelvic rest    3. Severe dysplasia of cervix (SOPHIA III)  Requires 20 years surveillance due to history of SOPHIA III    Natasha Don MD, MPH  Obstetrics and Gynecology        "

## 2020-11-09 ENCOUNTER — PATIENT OUTREACH (OUTPATIENT)
Dept: OBGYN | Facility: CLINIC | Age: 50
End: 2020-11-09

## 2020-11-09 DIAGNOSIS — D06.9 SEVERE DYSPLASIA OF CERVIX (CIN III): ICD-10-CM

## 2020-11-13 ENCOUNTER — VIRTUAL VISIT (OUTPATIENT)
Dept: FAMILY MEDICINE | Facility: CLINIC | Age: 50
End: 2020-11-13
Payer: COMMERCIAL

## 2020-11-13 VITALS — BODY MASS INDEX: 22.2 KG/M2 | WEIGHT: 130 LBS | HEIGHT: 64 IN

## 2020-11-13 DIAGNOSIS — L30.9 DERMATITIS: ICD-10-CM

## 2020-11-13 DIAGNOSIS — T78.40XA ALLERGIC REACTION, INITIAL ENCOUNTER: Primary | ICD-10-CM

## 2020-11-13 PROCEDURE — 99213 OFFICE O/P EST LOW 20 MIN: CPT | Mod: 95 | Performed by: FAMILY MEDICINE

## 2020-11-13 RX ORDER — PREDNISONE 20 MG/1
40 TABLET ORAL DAILY
Qty: 10 TABLET | Refills: 0 | Status: SHIPPED | OUTPATIENT
Start: 2020-11-13 | End: 2020-11-18

## 2020-11-13 RX ORDER — TRIAMCINOLONE ACETONIDE 1 MG/G
CREAM TOPICAL 2 TIMES DAILY
Qty: 45 G | Refills: 1 | Status: SHIPPED | OUTPATIENT
Start: 2020-11-13 | End: 2021-03-24

## 2020-11-13 ASSESSMENT — MIFFLIN-ST. JEOR: SCORE: 1189.68

## 2020-11-13 NOTE — TELEPHONE ENCOUNTER
10/23/14 NIL pap, + HR HPV (not 16/18). Cotest in 1 year  2/15/16 Dx pap NIL with Neg HPV. Plan: cotest in 3 years.   3/10/20 LSIL pap, + HR HPV (not 16/18). Plan colp  06/08/20 [place] Bx - SOPHIA 3, ECC - high grade, EMB - Negative. Plan LEEP. CCT Tracking.  08/14/20 LEEP BX: SOPHIA 3, margins positive. Plan hysterectomy  9/23/20 Hysterectomy, Cervix: negative for residual displasia  11/02/20 Postop. Plan 1 year cotest (due 9/23/21) - Long-term surveillance through 9/2045    Per 2019 ASCCP Guidelines:  If hysterectomy is performed for treatment, patients should have 3 consecutive annual HPV-based tests before entering long-term surveillance. Long-term surveillance after treatment for histologic HSIL (SOPHIA 2 or SOPHIA 3) or AIS involves HPV-based testing at 3-year intervals for 25 years, regardless of whether the patient has had a hysterectomy either for treatment or at any point during the surveillance period (CIII).

## 2020-11-13 NOTE — PROGRESS NOTES
"Gale Willett is a 50 year old female who is being evaluated via a billable video visit.      The patient has been notified of following:     \"This video visit will be conducted via a call between you and your physician/provider. We have found that certain health care needs can be provided without the need for an in-person physical exam.  This service lets us provide the care you need with a video conversation.  If a prescription is necessary we can send it directly to your pharmacy.  If lab work is needed we can place an order for that and you can then stop by our lab to have the test done at a later time.    Video visits are billed at different rates depending on your insurance coverage.  Please reach out to your insurance provider with any questions.    If during the course of the call the physician/provider feels a video visit is not appropriate, you will not be charged for this service.\"    Patient has given verbal consent for Video visit? Yes  How would you like to obtain your AVS? MyChart  If you are dropped from the video visit, the video invite should be resent to: Text to cell phone: [phone]  Will anyone else be joining your video visit? No      Subjective     Gale Willett is a 50 year old female who presents today via video visit for the following health issues:    HPI     Rash  Onset/Duration: over a month  Description  Location: chest area and moves around. Now hips area too  Character: raised, red  Itching: severe  Intensity:  moderate  Progression of Symptoms:  same  Accompanying signs and symptoms:   Fever: no  Body aches or joint pain: no  Sore throat symptoms: no  Recent cold symptoms: no  History:           Previous episodes of similar rash: yes  New exposures:  None  Recent travel: no  Exposure to similar rash: no  Precipitating or alleviating factors:   Therapies tried and outcome: Benadryl/diphenhydramine -  not effective       Rash has been moving around her body. Intensely itchy. " Benadryl not helpful.     Has had rash on both sides of the body. Involving trunk mostly.     Believes she has shingles.       Video Start Time: 8:49 AM      Review of Systems   Constitutional, HEENT, cardiovascular, pulmonary, gi and gu systems are negative, except as otherwise noted.      Objective           Vitals:  No vitals were obtained today due to virtual visit.    Physical Exam     GENERAL: Healthy, alert and no distress  EYES: Eyes grossly normal to inspection.  No discharge or erythema, or obvious scleral/conjunctival abnormalities.  RESP: No audible wheeze, cough, or visible cyanosis.  No visible retractions or increased work of breathing.    SKIN: Visible skin clear. No significant rash, abnormal pigmentation or lesions.  NEURO: Cranial nerves grossly intact.  Mentation and speech appropriate for age.  PSYCH: Mentation appears normal, affect normal/bright, judgement and insight intact, normal speech and appearance well-groomed.          Assessment & Plan     1. Allergic reaction, initial encounter - discussed diagnosis based on her symptoms. Advised she review topicals and ingesteds to see what could be causing her symptoms. Also advised that stress can bring this about as well.   - predniSONE (DELTASONE) 20 MG tablet; Take 2 tablets (40 mg) by mouth daily for 5 days  Dispense: 10 tablet; Refill: 0  - triamcinolone (KENALOG) 0.1 % external cream; Apply topically 2 times daily  Dispense: 45 g; Refill: 1    2. Dermatitis - will treat as below. She will send photos for me to review.   - predniSONE (DELTASONE) 20 MG tablet; Take 2 tablets (40 mg) by mouth daily for 5 days  Dispense: 10 tablet; Refill: 0  - triamcinolone (KENALOG) 0.1 % external cream; Apply topically 2 times daily  Dispense: 45 g; Refill: 1          No follow-ups on file.    Miriam Benitez MD  [facility]      Video-Visit Details    Type of service:  Video Visit    Video End Time: 8:58 AM    Originating Location  (pt. Location): Home    Distant Location (provider location):  [facility]     Platform used for Video Visit: Patrice

## 2020-11-19 ENCOUNTER — MYC REFILL (OUTPATIENT)
Dept: INTERNAL MEDICINE | Facility: CLINIC | Age: 50
End: 2020-11-19

## 2020-11-19 DIAGNOSIS — E03.8 OTHER SPECIFIED HYPOTHYROIDISM: ICD-10-CM

## 2020-11-21 ENCOUNTER — MYC REFILL (OUTPATIENT)
Dept: INTERNAL MEDICINE | Facility: CLINIC | Age: 50
End: 2020-11-21

## 2020-11-21 DIAGNOSIS — E03.8 OTHER SPECIFIED HYPOTHYROIDISM: ICD-10-CM

## 2020-11-21 RX ORDER — LEVOTHYROXINE SODIUM 100 UG/1
100 TABLET ORAL DAILY
Qty: 90 TABLET | Refills: 0 | Status: CANCELLED | OUTPATIENT
Start: 2020-11-21

## 2020-11-22 ENCOUNTER — MYC REFILL (OUTPATIENT)
Dept: INTERNAL MEDICINE | Facility: CLINIC | Age: 50
End: 2020-11-22

## 2020-11-22 DIAGNOSIS — E03.8 OTHER SPECIFIED HYPOTHYROIDISM: ICD-10-CM

## 2020-11-22 RX ORDER — LEVOTHYROXINE SODIUM 100 UG/1
100 TABLET ORAL DAILY
Qty: 90 TABLET | Refills: 0 | Status: CANCELLED | OUTPATIENT
Start: 2020-11-22

## 2020-11-23 RX ORDER — LEVOTHYROXINE SODIUM 100 UG/1
100 TABLET ORAL DAILY
Qty: 90 TABLET | Refills: 0 | Status: SHIPPED | OUTPATIENT
Start: 2020-11-23 | End: 2021-02-22

## 2020-11-23 NOTE — TELEPHONE ENCOUNTER
Prescription approved per [facility].  Kandi Fowler RN

## 2020-11-24 ENCOUNTER — MYC MEDICAL ADVICE (OUTPATIENT)
Dept: INTERNAL MEDICINE | Facility: CLINIC | Age: 50
End: 2020-11-24

## 2020-11-24 NOTE — TELEPHONE ENCOUNTER
Pending Prescriptions:                       Disp   Refills    levothyroxine (SYNTHROID/LEVOTHROID) 100 *90 tab*0            Sig: Take 1 tablet (100 mcg) by mouth daily      Duplicate request.  Medication e-scribed 11/23/20.

## 2020-11-24 NOTE — TELEPHONE ENCOUNTER
Pending Prescriptions:                       Disp   Refills    levothyroxine (SYNTHROID/LEVOTHROID) 100 *90 tab*0            Sig: Take 1 tablet (100 mcg) by mouth daily      Duplicate request.  E-scribed 11/23/20.

## 2021-02-18 ENCOUNTER — MYC MEDICAL ADVICE (OUTPATIENT)
Dept: INTERNAL MEDICINE | Facility: CLINIC | Age: 51
End: 2021-02-18

## 2021-02-18 DIAGNOSIS — E03.8 OTHER SPECIFIED HYPOTHYROIDISM: ICD-10-CM

## 2021-02-19 RX ORDER — LEVOTHYROXINE SODIUM 100 UG/1
100 TABLET ORAL DAILY
Qty: 90 TABLET | Refills: 0 | Status: CANCELLED | OUTPATIENT
Start: 2021-02-19

## 2021-02-19 NOTE — TELEPHONE ENCOUNTER
"Requested Prescriptions   Pending Prescriptions Disp Refills     levothyroxine (SYNTHROID/LEVOTHROID) 100 MCG tablet 90 tablet 0     Sig: Take 1 tablet (100 mcg) by mouth daily       Thyroid Protocol Failed - 2/19/2021  7:59 AM        Failed - Normal TSH on file in past 12 months     Recent Labs   Lab Test 09/21/20  1039   TSH 0.16*              Passed - Patient is 12 years or older        Passed - Recent (12 mo) or future (30 days) visit within the authorizing provider's specialty     Patient has had an office visit with the authorizing provider or a provider within the authorizing providers department within the previous 12 mos or has a future within next 30 days. See \"Patient Info\" tab in inbasket, or \"Choose Columns\" in Meds & Orders section of the refill encounter.              Passed - Medication is active on med list        Passed - No active pregnancy on record     If patient is pregnant or has had a positive pregnancy test, please check TSH.          Passed - No positive pregnancy test in past 12 months     If patient is pregnant or has had a positive pregnancy test, please check TSH.               "

## 2021-02-19 NOTE — TELEPHONE ENCOUNTER
"Routing refill request to provider for review/approval because:  Patient fails protocol    TSH   Date Value Ref Range Status   09/21/2020 0.16 (L) 0.40 - 4.00 mU/L Final     Per 9/21/20 lab result note:  \"The mildly low TSH may suggest that you are receiving too much thyroid supplementation, but the actual thyroid level (Free T4) is inside the normal limits so continue the same dose of levothyroxine for now.   Recheck in 6 months when you return to see your primary MD\"    Do you want to recheck TSH?  "

## 2021-02-23 ENCOUNTER — MYC MEDICAL ADVICE (OUTPATIENT)
Dept: INTERNAL MEDICINE | Facility: CLINIC | Age: 51
End: 2021-02-23

## 2021-02-23 NOTE — TELEPHONE ENCOUNTER
Message sent via [employer].      Khloe Zavala CMA  [facility]  [place]

## 2021-02-26 DIAGNOSIS — E03.8 OTHER SPECIFIED HYPOTHYROIDISM: ICD-10-CM

## 2021-02-26 PROCEDURE — 36415 COLL VENOUS BLD VENIPUNCTURE: CPT | Performed by: INTERNAL MEDICINE

## 2021-02-26 PROCEDURE — 84443 ASSAY THYROID STIM HORMONE: CPT | Performed by: INTERNAL MEDICINE

## 2021-02-26 PROCEDURE — 84439 ASSAY OF FREE THYROXINE: CPT | Performed by: INTERNAL MEDICINE

## 2021-02-27 LAB
T4 FREE SERPL-MCNC: 1.36 NG/DL (ref 0.76–1.46)
TSH SERPL DL<=0.005 MIU/L-ACNC: 0.12 MU/L (ref 0.4–4)

## 2021-03-24 ENCOUNTER — OFFICE VISIT (OUTPATIENT)
Dept: INTERNAL MEDICINE | Facility: CLINIC | Age: 51
End: 2021-03-24
Payer: COMMERCIAL

## 2021-03-24 VITALS
WEIGHT: 138 LBS | SYSTOLIC BLOOD PRESSURE: 102 MMHG | RESPIRATION RATE: 12 BRPM | OXYGEN SATURATION: 100 % | TEMPERATURE: 98 F | BODY MASS INDEX: 23.56 KG/M2 | HEART RATE: 63 BPM | HEIGHT: 64 IN | DIASTOLIC BLOOD PRESSURE: 70 MMHG

## 2021-03-24 DIAGNOSIS — Z12.31 VISIT FOR SCREENING MAMMOGRAM: ICD-10-CM

## 2021-03-24 DIAGNOSIS — T75.3XXD MOTION SICKNESS, SUBSEQUENT ENCOUNTER: ICD-10-CM

## 2021-03-24 DIAGNOSIS — Z00.00 ROUTINE GENERAL MEDICAL EXAMINATION AT A HEALTH CARE FACILITY: Primary | ICD-10-CM

## 2021-03-24 PROCEDURE — 99396 PREV VISIT EST AGE 40-64: CPT | Performed by: INTERNAL MEDICINE

## 2021-03-24 RX ORDER — SCOLOPAMINE TRANSDERMAL SYSTEM 1 MG/1
1 PATCH, EXTENDED RELEASE TRANSDERMAL
Qty: 3 PATCH | Refills: 0 | Status: SHIPPED | OUTPATIENT
Start: 2021-03-24 | End: 2022-05-24

## 2021-03-24 ASSESSMENT — ENCOUNTER SYMPTOMS
PALPITATIONS: 0
FREQUENCY: 0
HEARTBURN: 0
DYSURIA: 0
PARESTHESIAS: 0
HEADACHES: 0
EYE PAIN: 0
CONSTIPATION: 0
WEAKNESS: 0
ABDOMINAL PAIN: 0
HEMATOCHEZIA: 0
FEVER: 0
DIARRHEA: 0
SHORTNESS OF BREATH: 0
SORE THROAT: 0
ARTHRALGIAS: 0
NAUSEA: 0
COUGH: 0
DIZZINESS: 0
NERVOUS/ANXIOUS: 0
MYALGIAS: 0
BREAST MASS: 0
JOINT SWELLING: 0
CHILLS: 0
HEMATURIA: 0

## 2021-03-24 ASSESSMENT — MIFFLIN-ST. JEOR: SCORE: 1225.96

## 2021-03-24 NOTE — PROGRESS NOTES
SUBJECTIVE:   CC: Gale Willett is an 50 year old woman who presents for preventive health visit.     Patient has been advised of split billing requirements and indicates understanding: Yes  Healthy Habits:     Getting at least 3 servings of Calcium per day:  NO    Bi-annual eye exam:  Yes    Dental care twice a year:  Yes    Sleep apnea or symptoms of sleep apnea:  None    Diet:  Regular (no restrictions)    Frequency of exercise:  2-3 days/week    Duration of exercise:  30-45 minutes    Taking medications regularly:  Yes    Medication side effects:  None    PHQ-2 Total Score: 0    Additional concerns today:  No    Hypothyroidism.  She has had a stable weight.  Stable mood.  No skin or hair changes.     Today's PHQ-2 Score:   PHQ-2 ( 1999 Pfizer) 3/24/2021   Q1: Little interest or pleasure in doing things 0   Q2: Feeling down, depressed or hopeless 0   PHQ-2 Score 0   Q1: Little interest or pleasure in doing things Not at all   Q2: Feeling down, depressed or hopeless Not at all   PHQ-2 Score 0       Abuse: Current or Past (Physical, Sexual or Emotional) - No  Do you feel safe in your environment? Yes        Social History     Tobacco Use     Smoking status: Never Smoker     Smokeless tobacco: Never Used   Substance Use Topics     Alcohol use: Yes     Alcohol/week: 0.0 standard drinks     Comment: 1-2 times per week (one glass of wine)     If you drink alcohol do you typically have >3 drinks per day or >7 drinks per week? No    Alcohol Use 3/24/2021   Prescreen: >3 drinks/day or >7 drinks/week? No   Prescreen: >3 drinks/day or >7 drinks/week? -       Any new diagnosis of family breast, ovarian, or bowel cancer? Yes, aunt with breast cancer     Reviewed orders with patient.  Reviewed health maintenance and updated orders accordingly - Yes      Breast CA Risk Screening:  No flowsheet data found.    Mammogram Screening: Recommended annual mammography  Pertinent mammograms are reviewed under the imaging  "tab.    History of abnormal Pap smear:s/p  hysterectomy   PAP / HPV Latest Ref Rng & Units 3/10/2020 2/15/2016 10/23/2014   PAP - LSIL(A) NIL NIL   HPV 16 DNA NEG:Negative Negative Negative -   HPV 18 DNA NEG:Negative Negative Negative -   OTHER HR HPV NEG:Negative Positive(A) Negative -     Reviewed and updated as needed this visit by clinical staff  Tobacco  Allergies  Meds   Med Hx  Surg Hx  Fam Hx  Soc Hx        Reviewed and updated as needed this visit by Provider                    Review of Systems   Constitutional: Negative for chills and fever.   HENT: Negative for congestion, ear pain, hearing loss and sore throat.    Eyes: Negative for pain and visual disturbance.   Respiratory: Negative for cough and shortness of breath.    Cardiovascular: Negative for chest pain, palpitations and peripheral edema.   Gastrointestinal: Negative for abdominal pain, constipation, diarrhea, heartburn, hematochezia and nausea.   Breasts:  Negative for tenderness, breast mass and discharge.   Genitourinary: Negative for dysuria, frequency, genital sores, hematuria, pelvic pain, urgency, vaginal bleeding and vaginal discharge.   Musculoskeletal: Negative for arthralgias, joint swelling and myalgias.   Skin: Negative for rash.   Neurological: Negative for dizziness, weakness, headaches and paresthesias.   Psychiatric/Behavioral: Negative for mood changes. The patient is not nervous/anxious.           OBJECTIVE:   /70   Pulse 63   Temp 98  F (36.7  C) (Oral)   Resp 12   Ht 1.626 m (5' 4\")   Wt 62.6 kg (138 lb)   LMP 08/23/2020   SpO2 100%   Breastfeeding No   BMI 23.69 kg/m    Physical Exam  GENERAL APPEARANCE: healthy, alert and no distress  EYES: Eyes grossly normal to inspection, PERRL and conjunctivae and sclerae normal  HENT: ear canals and TM's normal, nose and mouth without ulcers or lesions, oropharynx clear and oral mucous membranes moist  NECK: no adenopathy, no asymmetry, masses, or scars and " "thyroid normal to palpation  RESP: lungs clear to auscultation - no rales, rhonchi or wheezes  BREAST: normal without masses, tenderness or nipple discharge and no palpable axillary masses or adenopathy  CV: regular rate and rhythm, normal S1 S2, no S3 or S4, no murmur, click or rub, no peripheral edema and peripheral pulses strong  ABDOMEN: soft, nontender, no hepatosplenomegaly, no masses and bowel sounds normal  MS: no musculoskeletal defects are noted and gait is age appropriate without ataxia  SKIN: no suspicious lesions or rashes  NEURO: Normal strength and tone, sensory exam grossly normal, mentation intact and speech normal  PSYCH: mentation appears normal and affect normal/bright    Diagnostic Test Results:  Labs reviewed in Epic    ASSESSMENT/PLAN:       ICD-10-CM    1. Routine general medical examination at a health care facility  Z00.00    2. Visit for screening mammogram  Z12.31 *MA Screening Digital Bilateral   3. Motion sickness, subsequent encounter  T75.3XXD scopolamine (TRANSDERM) 1 MG/3DAYS 72 hr patch   traveling and will be on a boat- requesting motion sickness treatment    Patient has been advised of split billing requirements and indicates understanding: Yes  COUNSELING:  Reviewed preventive health counseling, as reflected in patient instructions       Regular exercise       Healthy diet/nutrition    Estimated body mass index is 23.69 kg/m  as calculated from the following:    Height as of this encounter: 1.626 m (5' 4\").    Weight as of this encounter: 62.6 kg (138 lb).        She reports that she has never smoked. She has never used smokeless tobacco.      Counseling Resources:  ATP IV Guidelines  Pooled Cohorts Equation Calculator  Breast Cancer Risk Calculator  BRCA-Related Cancer Risk Assessment: FHS-7 Tool  FRAX Risk Assessment  ICSI Preventive Guidelines  Dietary Guidelines for Americans, 2010  USDA's MyPlate  ASA Prophylaxis  Lung CA Screening    Kassi Garcia MD  [facility] " [facility]

## 2021-03-24 NOTE — NURSING NOTE
"/70   Pulse 63   Temp 98  F (36.7  C) (Oral)   Resp 12   Ht 1.626 m (5' 4\")   Wt 62.6 kg (138 lb)   LMP 08/23/2020   SpO2 100%   Breastfeeding No   BMI 23.69 kg/m      "

## 2021-04-03 ENCOUNTER — ANCILLARY PROCEDURE (OUTPATIENT)
Dept: MAMMOGRAPHY | Facility: CLINIC | Age: 51
End: 2021-04-03
Attending: INTERNAL MEDICINE
Payer: COMMERCIAL

## 2021-04-03 DIAGNOSIS — Z12.31 VISIT FOR SCREENING MAMMOGRAM: ICD-10-CM

## 2021-04-03 PROCEDURE — 77063 BREAST TOMOSYNTHESIS BI: CPT | Mod: TC | Performed by: RADIOLOGY

## 2021-04-03 PROCEDURE — 77067 SCR MAMMO BI INCL CAD: CPT | Mod: TC | Performed by: RADIOLOGY

## 2021-04-11 ENCOUNTER — MYC MEDICAL ADVICE (OUTPATIENT)
Dept: INTERNAL MEDICINE | Facility: CLINIC | Age: 51
End: 2021-04-11

## 2021-05-24 DIAGNOSIS — E03.8 OTHER SPECIFIED HYPOTHYROIDISM: ICD-10-CM

## 2021-05-25 RX ORDER — LEVOTHYROXINE SODIUM 100 UG/1
100 TABLET ORAL DAILY
Qty: 90 TABLET | Refills: 3 | Status: SHIPPED | OUTPATIENT
Start: 2021-05-25 | End: 2022-05-24

## 2021-05-25 NOTE — TELEPHONE ENCOUNTER
Routing refill request to provider for review/approval because:  Labs out of range:    TSH   Date Value Ref Range Status   02/26/2021 0.12 (L) 0.40 - 4.00 mU/L Final     Rock Saez RN, BSN

## 2021-09-09 ENCOUNTER — PATIENT OUTREACH (OUTPATIENT)
Dept: INTERNAL MEDICINE | Facility: CLINIC | Age: 51
End: 2021-09-09
Payer: COMMERCIAL

## 2021-09-09 DIAGNOSIS — D06.9 SEVERE DYSPLASIA OF CERVIX (CIN III): ICD-10-CM

## 2021-09-09 NOTE — LETTER
September 9, 2021      Gale Willett  [address] [address]        Dear ,    This letter is to remind you that you are due for your follow-up Pap smear and Human Papillomavirus (HPV) test.    Please call [phone] to schedule your appointment at your earliest convenience.    If you have completed the appointment outside of the [facility] system, please have the records forwarded to our office. We will update your chart for your provider to review before your next annual wellness visit.     Thank you for choosing [facility]!      Sincerely,    Your [facility] Care Team

## 2021-10-03 ENCOUNTER — HEALTH MAINTENANCE LETTER (OUTPATIENT)
Age: 51
End: 2021-10-03

## 2021-10-05 NOTE — TELEPHONE ENCOUNTER
Patient due for Pap and HPV.    Reminder done today via telephone call - spoke with the patient.

## 2021-10-25 ENCOUNTER — IMMUNIZATION (OUTPATIENT)
Dept: FAMILY MEDICINE | Facility: CLINIC | Age: 51
End: 2021-10-25
Payer: COMMERCIAL

## 2021-10-25 PROCEDURE — 90471 IMMUNIZATION ADMIN: CPT

## 2021-10-25 PROCEDURE — 90682 RIV4 VACC RECOMBINANT DNA IM: CPT

## 2022-03-15 NOTE — TELEPHONE ENCOUNTER
Dr. Garcia,    Patient is lost to pap tracking follow-up. Attempts to contact pt have been made per reminder process and there has been no reply and/or no appt scheduled.      Miriam Guzman RN  Pap Tracking     10/23/14 NIL pap, + HR HPV (not 16/18). Cotest in 1 year  2/15/16 Dx pap NIL with Neg HPV. Plan: cotest in 3 years.   3/10/20 LSIL pap, + HR HPV (not 16/18). Plan colp  06/08/20 [place] Bx - SOPHIA 3, ECC - high grade, EMB - Negative. Plan LEEP. CCT Tracking.  08/14/20 LEEP BX: SOPHIA 3, margins positive. Plan hysterectomy  9/23/20 Hysterectomy, Cervix: negative for residual displasia  11/02/20 Postop. Plan 1 year cotest (due 9/23/21) - Long-term surveillance through 9/2045 09/09/21 Reminder [place], Letter.   10/5/21 Reminder call - spoke to patient, reminder given   11/30/21 Annual - notes added - cancelled  2/15/22 annual - notes added - cancelled, will call to r/s.  3/15/2022 Lost to follow-up for pap tracking      Per 2019 ASCCP Guidelines:  If hysterectomy is performed for treatment, patients should have 3 consecutive annual HPV-based tests before entering long-term surveillance. Long-term surveillance after treatment for histologic HSIL (SOPHIA 2 or SOPHIA 3) or AIS involves HPV-based testing at 3-year intervals for 25 years, regardless of whether the patient has had a hysterectomy either for treatment or at any point during the surveillance period (CIII).

## 2022-05-15 ENCOUNTER — HEALTH MAINTENANCE LETTER (OUTPATIENT)
Age: 52
End: 2022-05-15

## 2022-05-24 ENCOUNTER — OFFICE VISIT (OUTPATIENT)
Dept: FAMILY MEDICINE | Facility: CLINIC | Age: 52
End: 2022-05-24
Payer: COMMERCIAL

## 2022-05-24 VITALS
OXYGEN SATURATION: 99 % | HEART RATE: 44 BPM | BODY MASS INDEX: 24.31 KG/M2 | TEMPERATURE: 98.1 F | SYSTOLIC BLOOD PRESSURE: 108 MMHG | DIASTOLIC BLOOD PRESSURE: 70 MMHG | HEIGHT: 63 IN | WEIGHT: 137.2 LBS | RESPIRATION RATE: 18 BRPM

## 2022-05-24 DIAGNOSIS — Z12.4 CERVICAL CANCER SCREENING: ICD-10-CM

## 2022-05-24 DIAGNOSIS — E89.0 H/O PARTIAL THYROIDECTOMY: ICD-10-CM

## 2022-05-24 DIAGNOSIS — E89.0 POSTOPERATIVE HYPOTHYROIDISM: ICD-10-CM

## 2022-05-24 DIAGNOSIS — Z12.31 VISIT FOR SCREENING MAMMOGRAM: ICD-10-CM

## 2022-05-24 DIAGNOSIS — Z11.59 NEED FOR HEPATITIS C SCREENING TEST: ICD-10-CM

## 2022-05-24 DIAGNOSIS — Z00.00 ROUTINE GENERAL MEDICAL EXAMINATION AT A HEALTH CARE FACILITY: Primary | ICD-10-CM

## 2022-05-24 PROBLEM — N92.0 MENORRHAGIA: Status: RESOLVED | Noted: 2020-08-20 | Resolved: 2022-05-24

## 2022-05-24 PROBLEM — N87.9 CERVICAL DYSPLASIA: Status: RESOLVED | Noted: 2020-08-20 | Resolved: 2022-05-24

## 2022-05-24 PROCEDURE — 99396 PREV VISIT EST AGE 40-64: CPT | Performed by: FAMILY MEDICINE

## 2022-05-24 RX ORDER — LEVOTHYROXINE SODIUM 100 UG/1
100 TABLET ORAL DAILY
Qty: 90 TABLET | Refills: 3 | Status: SHIPPED | OUTPATIENT
Start: 2022-05-24 | End: 2023-06-15

## 2022-05-24 SDOH — HEALTH STABILITY: PHYSICAL HEALTH: ON AVERAGE, HOW MANY DAYS PER WEEK DO YOU ENGAGE IN MODERATE TO STRENUOUS EXERCISE (LIKE A BRISK WALK)?: 5 DAYS

## 2022-05-24 SDOH — ECONOMIC STABILITY: INCOME INSECURITY: HOW HARD IS IT FOR YOU TO PAY FOR THE VERY BASICS LIKE FOOD, HOUSING, MEDICAL CARE, AND HEATING?: NOT HARD AT ALL

## 2022-05-24 SDOH — ECONOMIC STABILITY: TRANSPORTATION INSECURITY
IN THE PAST 12 MONTHS, HAS LACK OF TRANSPORTATION KEPT YOU FROM MEETINGS, WORK, OR FROM GETTING THINGS NEEDED FOR DAILY LIVING?: NO

## 2022-05-24 SDOH — ECONOMIC STABILITY: INCOME INSECURITY: IN THE LAST 12 MONTHS, WAS THERE A TIME WHEN YOU WERE NOT ABLE TO PAY THE MORTGAGE OR RENT ON TIME?: NO

## 2022-05-24 SDOH — ECONOMIC STABILITY: FOOD INSECURITY: WITHIN THE PAST 12 MONTHS, YOU WORRIED THAT YOUR FOOD WOULD RUN OUT BEFORE YOU GOT MONEY TO BUY MORE.: NEVER TRUE

## 2022-05-24 SDOH — ECONOMIC STABILITY: TRANSPORTATION INSECURITY
IN THE PAST 12 MONTHS, HAS THE LACK OF TRANSPORTATION KEPT YOU FROM MEDICAL APPOINTMENTS OR FROM GETTING MEDICATIONS?: NO

## 2022-05-24 SDOH — HEALTH STABILITY: PHYSICAL HEALTH: ON AVERAGE, HOW MANY MINUTES DO YOU ENGAGE IN EXERCISE AT THIS LEVEL?: 40 MIN

## 2022-05-24 SDOH — ECONOMIC STABILITY: FOOD INSECURITY: WITHIN THE PAST 12 MONTHS, THE FOOD YOU BOUGHT JUST DIDN'T LAST AND YOU DIDN'T HAVE MONEY TO GET MORE.: NEVER TRUE

## 2022-05-24 ASSESSMENT — ENCOUNTER SYMPTOMS
ABDOMINAL PAIN: 0
SORE THROAT: 0
HEMATOCHEZIA: 0
JOINT SWELLING: 0
NAUSEA: 0
HEADACHES: 0
COUGH: 0
SHORTNESS OF BREATH: 0
CHILLS: 0
MYALGIAS: 1
EYE PAIN: 0
HEARTBURN: 0
HEMATURIA: 0
PARESTHESIAS: 0
FREQUENCY: 0
PALPITATIONS: 0
DIARRHEA: 0
DIZZINESS: 0
DYSURIA: 0
WEAKNESS: 0
BREAST MASS: 0
CONSTIPATION: 0
ARTHRALGIAS: 1
FEVER: 0
NERVOUS/ANXIOUS: 0

## 2022-05-24 ASSESSMENT — SOCIAL DETERMINANTS OF HEALTH (SDOH)
DO YOU BELONG TO ANY CLUBS OR ORGANIZATIONS SUCH AS CHURCH GROUPS UNIONS, FRATERNAL OR ATHLETIC GROUPS, OR SCHOOL GROUPS?: YES
HOW OFTEN DO YOU GET TOGETHER WITH FRIENDS OR RELATIVES?: ONCE A WEEK
IN A TYPICAL WEEK, HOW MANY TIMES DO YOU TALK ON THE PHONE WITH FAMILY, FRIENDS, OR NEIGHBORS?: TWICE A WEEK
HOW OFTEN DO YOU ATTEND CHURCH OR RELIGIOUS SERVICES?: 1 TO 4 TIMES PER YEAR

## 2022-05-24 ASSESSMENT — LIFESTYLE VARIABLES
HOW OFTEN DO YOU HAVE A DRINK CONTAINING ALCOHOL: 2-4 TIMES A MONTH
HOW OFTEN DO YOU HAVE SIX OR MORE DRINKS ON ONE OCCASION: NEVER
SKIP TO QUESTIONS 9-10: 0
AUDIT-C TOTAL SCORE: 3
HOW MANY STANDARD DRINKS CONTAINING ALCOHOL DO YOU HAVE ON A TYPICAL DAY: 3 OR 4

## 2022-05-24 NOTE — PROGRESS NOTES
SUBJECTIVE:   CC: Gale Willett is an 51 year old woman who presents for preventive health visit.       Patient has been advised of split billing requirements and indicates understanding: Yes     Healthy Habits:     Getting at least 3 servings of Calcium per day:  NO    Bi-annual eye exam:  Yes    Dental care twice a year:  Yes    Sleep apnea or symptoms of sleep apnea:  None    Diet:  Carbohydrate counting, Breakfast skipped and Other    Frequency of exercise:  4-5 days/week    Duration of exercise:  30-45 minutes    Taking medications regularly:  Yes    Medication side effects:  None    PHQ-2 Total Score: 0    Additional concerns today:  No      Hasn't been taking her levothyroxine regularly.     Following with Gyn for well woman exams, recheck pap. Has pap scheduled.       Today's PHQ-2 Score:   PHQ-2 ( 1999 [employer]) 5/24/2022   Q1: Little interest or pleasure in doing things 0   Q2: Feeling down, depressed or hopeless 0   PHQ-2 Score 0   PHQ-2 Total Score (12-17 Years)- Positive if 3 or more points; Administer PHQ-A if positive -   Q1: Little interest or pleasure in doing things Not at all   Q2: Feeling down, depressed or hopeless Not at all   PHQ-2 Score 0       Abuse: Current or Past (Physical, Sexual or Emotional) - No  Do you feel safe in your environment? Yes    Have you ever done Advance Care Planning? (For example, a Health Directive, POLST, or a discussion with a medical provider or your loved ones about your wishes): No, advance care planning information given to patient to review.  Patient plans to discuss their wishes with loved ones or provider.      Social History     Tobacco Use     Smoking status: Never Smoker     Smokeless tobacco: Never Used   Substance Use Topics     Alcohol use: Yes     Alcohol/week: 0.0 standard drinks     Comment: 1-2 times per week (one glass of wine)         Alcohol Use 5/24/2022   Prescreen: >3 drinks/day or >7 drinks/week? No   Prescreen: >3 drinks/day or >7  drinks/week? -       Reviewed orders with patient.  Reviewed health maintenance and updated orders accordingly - Yes  Patient Active Problem List   Diagnosis     Family history of Alzheimer's disease     Postoperative hypothyroidism     History of thyroid cancer     Family history of osteoporosis     Vitamin D insufficiency     Severe dysplasia of cervix (SOPHIA III)     Medial meniscus tear     H/O partial thyroidectomy     Past Surgical History:   Procedure Laterality Date     BIOPSY  2012    Neck      SECTION  2003     ENT SURGERY  2009    partial thyroidectomy     LAPAROSCOPIC HYSTERECTOMY TOTAL, SALPINGECTOMY BILATERAL Bilateral 2020    Procedure: TOTAL LAPAROSCOPIC HYSTERECTOMY, BILATERAL SALPINGECTOMY;  Surgeon: Natasha Don MD;  Location:  OR       Social History     Tobacco Use     Smoking status: Never Smoker     Smokeless tobacco: Never Used   Substance Use Topics     Alcohol use: Yes     Alcohol/week: 0.0 standard drinks     Comment: 1-2 times per week (one glass of wine)     Family History   Problem Relation Age of Onset     Hypertension Mother      Alzheimer Disease Mother         under age 50     Hyperlipidemia Mother      Cerebrovascular Disease Mother      Hypertension Father      Hyperlipidemia Father      Obesity Father      Colon Cancer No family hx of            Breast Cancer Screening:    FHS-7:   Breast CA Risk Assessment (FHS-7) 2022   Did any of your first-degree relatives have breast or ovarian cancer? No   Did any of your relatives have bilateral breast cancer? Yes   Did any man in your family have breast cancer? No   Did any woman in your family have breast and ovarian cancer? Yes   Did any woman in your family have breast cancer before age 50 y? Yes   Do you have 2 or more relatives with breast and/or ovarian cancer? No   Do you have 2 or more relatives with breast and/or bowel cancer? No       Mammogram Screening: Recommended annual mammography  Pertinent  "mammograms are reviewed under the imaging tab.    History of abnormal Pap smear: YES - updated in Problem List and Health Maintenance accordingly  PAP / HPV Latest Ref Rng & Units 3/10/2020 2/15/2016 10/23/2014   PAP (Historical) - LSIL(A) NIL NIL   HPV16 NEG:Negative Negative Negative -   HPV18 NEG:Negative Negative Negative -   HRHPV NEG:Negative Positive(A) Negative -     Reviewed and updated as needed this visit by clinical staff   Tobacco  Allergies  Meds   Med Hx  Surg Hx  Fam Hx  Soc Hx          Reviewed and updated as needed this visit by Provider                       Review of Systems   Constitutional: Negative for chills and fever.   HENT: Negative for congestion, ear pain, hearing loss and sore throat.    Eyes: Positive for visual disturbance. Negative for pain.   Respiratory: Negative for cough and shortness of breath.    Cardiovascular: Negative for chest pain, palpitations and peripheral edema.   Gastrointestinal: Negative for abdominal pain, constipation, diarrhea, heartburn, hematochezia and nausea.   Breasts:  Negative for tenderness, breast mass and discharge.   Genitourinary: Negative for dysuria, frequency, genital sores, hematuria, pelvic pain, urgency, vaginal bleeding and vaginal discharge.   Musculoskeletal: Positive for arthralgias and myalgias. Negative for joint swelling.   Skin: Negative for rash.   Neurological: Negative for dizziness, weakness, headaches and paresthesias.   Psychiatric/Behavioral: Negative for mood changes. The patient is not nervous/anxious.         OBJECTIVE:   /70   Pulse (!) 44   Temp 98.1  F (36.7  C) (Oral)   Resp 18   Ht 1.607 m (5' 3.25\")   Wt 62.2 kg (137 lb 3.2 oz)   LMP 08/23/2020   SpO2 99%   BMI 24.11 kg/m    Physical Exam  GENERAL: healthy, alert and no distress  EYES: Eyes grossly normal to inspection, PERRL and conjunctivae and sclerae normal  HENT: ear canals and TM's normal, nose and mouth without ulcers or lesions  NECK: no " "adenopathy, no asymmetry, masses, or scars and thyroid normal to palpation  RESP: lungs clear to auscultation - no rales, rhonchi or wheezes  CV: regular rate and rhythm, normal S1 S2, no S3 or S4, no murmur, click or rub, no peripheral edema and peripheral pulses strong  ABDOMEN: soft, nontender, no hepatosplenomegaly, no masses and bowel sounds normal  MS: no gross musculoskeletal defects noted, no edema  SKIN: no suspicious lesions or rashes  NEURO: Normal strength and tone, mentation intact and speech normal  PSYCH: mentation appears normal, affect normal/bright    Diagnostic Test Results:  Labs reviewed in Epic    ASSESSMENT/PLAN:     1. Routine general medical examination at a health care facility  - Lipid panel reflex to direct LDL Fasting; Future  - Hemoglobin A1c; Future    2. Need for hepatitis C screening test - not needed, low risk    3. Cervical cancer screening - getting through OBGYN    4. Visit for screening mammogram - getting through OBGYN    5. Postoperative hypothyroidism - surveillance labs after she's been taking her med regularly for 6 weeks.   - TSH; Future  - T4, free; Future  - T3, total; Future  - levothyroxine (SYNTHROID/LEVOTHROID) 100 MCG tablet; Take 1 tablet (100 mcg) by mouth daily  Dispense: 90 tablet; Refill: 3    7. H/O partial thyroidectomy      COUNSELING:  Reviewed preventive health counseling, as reflected in patient instructions    Estimated body mass index is 24.11 kg/m  as calculated from the following:    Height as of this encounter: 1.607 m (5' 3.25\").    Weight as of this encounter: 62.2 kg (137 lb 3.2 oz).      She reports that she has never smoked. She has never used smokeless tobacco.      Miriam Benitez MD  [facility]  "

## 2022-06-07 ENCOUNTER — LAB (OUTPATIENT)
Dept: LAB | Facility: CLINIC | Age: 52
End: 2022-06-07
Payer: COMMERCIAL

## 2022-06-07 DIAGNOSIS — E89.0 POSTOPERATIVE HYPOTHYROIDISM: ICD-10-CM

## 2022-06-07 DIAGNOSIS — Z00.00 ROUTINE GENERAL MEDICAL EXAMINATION AT A HEALTH CARE FACILITY: ICD-10-CM

## 2022-06-07 LAB
CHOLEST SERPL-MCNC: 199 MG/DL
FASTING STATUS PATIENT QL REPORTED: YES
HBA1C MFR BLD: 5.1 % (ref 0–5.6)
HDLC SERPL-MCNC: 105 MG/DL
LDLC SERPL CALC-MCNC: 84 MG/DL
NONHDLC SERPL-MCNC: 94 MG/DL
T3 SERPL-MCNC: 99 NG/DL (ref 60–181)
T4 FREE SERPL-MCNC: 1.55 NG/DL (ref 0.76–1.46)
TRIGL SERPL-MCNC: 48 MG/DL
TSH SERPL DL<=0.005 MIU/L-ACNC: 0.05 MU/L (ref 0.4–4)

## 2022-06-07 PROCEDURE — 83036 HEMOGLOBIN GLYCOSYLATED A1C: CPT

## 2022-06-07 PROCEDURE — 36415 COLL VENOUS BLD VENIPUNCTURE: CPT

## 2022-06-07 PROCEDURE — 84443 ASSAY THYROID STIM HORMONE: CPT

## 2022-06-07 PROCEDURE — 84480 ASSAY TRIIODOTHYRONINE (T3): CPT

## 2022-06-07 PROCEDURE — 84439 ASSAY OF FREE THYROXINE: CPT

## 2022-06-07 PROCEDURE — 80061 LIPID PANEL: CPT

## 2022-07-10 ENCOUNTER — HEALTH MAINTENANCE LETTER (OUTPATIENT)
Age: 52
End: 2022-07-10

## 2022-07-11 ENCOUNTER — OFFICE VISIT (OUTPATIENT)
Dept: OBGYN | Facility: CLINIC | Age: 52
End: 2022-07-11
Payer: COMMERCIAL

## 2022-07-11 VITALS
DIASTOLIC BLOOD PRESSURE: 60 MMHG | HEIGHT: 63 IN | BODY MASS INDEX: 24.1 KG/M2 | WEIGHT: 136 LBS | SYSTOLIC BLOOD PRESSURE: 98 MMHG

## 2022-07-11 DIAGNOSIS — Z12.31 ENCOUNTER FOR SCREENING MAMMOGRAM FOR BREAST CANCER: ICD-10-CM

## 2022-07-11 DIAGNOSIS — Z90.710 S/P HYSTERECTOMY: ICD-10-CM

## 2022-07-11 DIAGNOSIS — D06.9 SEVERE DYSPLASIA OF CERVIX (CIN III): Primary | ICD-10-CM

## 2022-07-11 PROCEDURE — 88175 CYTOPATH C/V AUTO FLUID REDO: CPT | Performed by: OBSTETRICS & GYNECOLOGY

## 2022-07-11 PROCEDURE — 99213 OFFICE O/P EST LOW 20 MIN: CPT | Performed by: OBSTETRICS & GYNECOLOGY

## 2022-07-11 PROCEDURE — 87624 HPV HI-RISK TYP POOLED RSLT: CPT | Performed by: OBSTETRICS & GYNECOLOGY

## 2022-07-11 NOTE — NURSING NOTE
"Chief Complaint   Patient presents with     Gyn Exam     Pap due       Initial BP 98/60 (BP Location: Left arm, Patient Position: Chair, Cuff Size: Adult Regular)   Ht 1.607 m (5' 3.25\")   Wt 61.7 kg (136 lb)   LMP 08/23/2020   Breastfeeding No   BMI 23.90 kg/m   Estimated body mass index is 23.9 kg/m  as calculated from the following:    Height as of this encounter: 1.607 m (5' 3.25\").    Weight as of this encounter: 61.7 kg (136 lb).  BP completed using cuff size: regular    Questioned patient about current smoking habits.  Pt. has never smoked.      No obstetric history on file.    The following HM Due: NONE    Mery Chapin CMA    "

## 2022-07-11 NOTE — PROGRESS NOTES
"  SUBJECTIVE:                                                   CC:  Patient presents with:  Gyn Exam: Pap due      HPI:  Gale Willett is a 51 year old female who presents for pap smear and breast exam.    She has routine health care done by her new PCP Dr Miriam Huerta at [facility].     H/o hysterectomy for SOPHIA 3 in 9/2020.  Recommended for long term surveillance with pap smears of the vaginal cuff until 2045.     Gyn History:  Patient's last menstrual period was 08/23/2020.    Using hysterectomy for contraception.    Last 3 Pap and HPV Results:   PAP / HPV Latest Ref Rng & Units 3/10/2020 2/15/2016 10/23/2014   PAP (Historical) - LSIL(A) NIL NIL   HPV16 NEG:Negative Negative Negative -   HPV18 NEG:Negative Negative Negative -   HRHPV NEG:Negative Positive(A) Negative -       PMH, PSH, Soc Hx, Fam Hx, Meds, and allergies reviewed in Epic.    OBJECTIVE:     BP 98/60 (BP Location: Left arm, Patient Position: Chair, Cuff Size: Adult Regular)   Ht 1.607 m (5' 3.25\")   Wt 61.7 kg (136 lb)   LMP 08/23/2020   Breastfeeding No   BMI 23.90 kg/m      Gen: Healthy appearing thin female, no acute distress, comfortable  Breast: implants palpable, no skin changes or asymmetry, no masses palpated  Psych: mentation appears normal and affect bright  : Normal external female genitalia.  No external lesions. Atrophic c/w menopausal status  SSE: Speculum exam reveals vaginal epithelium atrophic with normal physiologic discharge. Cuff intact and pap smear obtained.    Test Results:  10/23/14 NIL pap, + HR HPV (not 16/18). Cotest in 1 year  2/15/16 Dx pap NIL with Neg HPV. Plan: cotest in 3 years.   3/10/20 LSIL pap, + HR HPV (not 16/18). Plan colp  06/08/20 [place] Bx - SOPHIA 3, ECC - high grade, EMB - Negative. Plan LEEP. CCT Tracking.  08/14/20 LEEP BX: SOPHIA 3, margins positive. Plan hysterectomy  9/23/20 Hysterectomy, Cervix: negative for residual displasia  11/02/20 Postop. Plan 1 year cotest (due 9/23/21) - Long-term " surveillance through 9/2045 09/09/21 Reminder Sandra, Letter.   10/5/21 Reminder call - spoke to patient, reminder given   11/30/21 Annual - notes added - cancelled  2/15/22 annual - notes added - cancelled, will call to r/s.  3/15/2022 Lost to follow-up for pap tracking      Per 2019 ASCCP Guidelines:  If hysterectomy is performed for treatment, patients should have 3 consecutive annual HPV-based tests before entering long-term surveillance. Long-term surveillance after treatment for histologic HSIL (SOPHIA 2 or SOPHIA 3) or AIS involves HPV-based testing at 3-year intervals for 25 years, regardless of whether the patient has had a hysterectomy either for treatment or at any point during the surveillance period (CIII).        ASSESSMENT/PLAN:                                                      1. Severe dysplasia of cervix (SOPHIA III)  S/p pap smear and breast exam today  - Pap imaged thin layer diagnostic with HPV (select HPV order below)    2. S/P hysterectomy  Reviewed long term surveillance plan, pt states understanding    3. Encounter for screening mammogram for breast cancer  - MA Screen with Implants Bilateral w/Carlos; Future     Natasha Don MD, MPH  Obstetrics and Gynecology

## 2022-07-14 LAB
BKR LAB AP GYN ADEQUACY: NORMAL
BKR LAB AP GYN INTERPRETATION: NORMAL
BKR LAB AP HPV REFLEX: NORMAL
BKR LAB AP PREVIOUS ABNL DX: NORMAL
BKR LAB AP PREVIOUS ABNORMAL: NORMAL
PATH REPORT.COMMENTS IMP SPEC: NORMAL
PATH REPORT.COMMENTS IMP SPEC: NORMAL
PATH REPORT.RELEVANT HX SPEC: NORMAL

## 2022-07-18 LAB
HUMAN PAPILLOMA VIRUS 16 DNA: NEGATIVE
HUMAN PAPILLOMA VIRUS 18 DNA: POSITIVE
HUMAN PAPILLOMA VIRUS FINAL DIAGNOSIS: ABNORMAL
HUMAN PAPILLOMA VIRUS OTHER HR: NEGATIVE

## 2022-07-25 ENCOUNTER — ANCILLARY PROCEDURE (OUTPATIENT)
Dept: MAMMOGRAPHY | Facility: CLINIC | Age: 52
End: 2022-07-25
Attending: OBSTETRICS & GYNECOLOGY
Payer: COMMERCIAL

## 2022-07-25 DIAGNOSIS — Z12.31 ENCOUNTER FOR SCREENING MAMMOGRAM FOR BREAST CANCER: ICD-10-CM

## 2022-07-25 PROCEDURE — 77063 BREAST TOMOSYNTHESIS BI: CPT | Mod: TC | Performed by: RADIOLOGY

## 2022-07-25 PROCEDURE — 77067 SCR MAMMO BI INCL CAD: CPT | Mod: TC | Performed by: RADIOLOGY

## 2022-07-29 ENCOUNTER — PATIENT OUTREACH (OUTPATIENT)
Dept: OBGYN | Facility: CLINIC | Age: 52
End: 2022-07-29

## 2022-09-06 ENCOUNTER — OFFICE VISIT (OUTPATIENT)
Dept: OBGYN | Facility: CLINIC | Age: 52
End: 2022-09-06
Payer: COMMERCIAL

## 2022-09-06 VITALS
DIASTOLIC BLOOD PRESSURE: 70 MMHG | HEIGHT: 63 IN | SYSTOLIC BLOOD PRESSURE: 114 MMHG | BODY MASS INDEX: 24.91 KG/M2 | WEIGHT: 140.6 LBS

## 2022-09-06 DIAGNOSIS — D06.9 SEVERE DYSPLASIA OF CERVIX (CIN III): Primary | ICD-10-CM

## 2022-09-06 DIAGNOSIS — B97.7 HIGH RISK HPV INFECTION: ICD-10-CM

## 2022-09-06 PROCEDURE — 88305 TISSUE EXAM BY PATHOLOGIST: CPT | Performed by: PATHOLOGY

## 2022-09-06 PROCEDURE — 57421 EXAM/BIOPSY OF VAG W/SCOPE: CPT | Performed by: OBSTETRICS & GYNECOLOGY

## 2022-09-06 NOTE — PROGRESS NOTES
"INDICATIONS:                                                    Gale Willett is a 52 year old female with hx hysterectomy for SOPHIA 3 in 2020, here today for vaginal colposcopy. Discussed indication, risks of infection and bleeding.    Is a pregnancy test required: No.  Was a consent obtained?  Yes    Lab Results   Component Value Date    PAP LSIL, HPV other 03/10/2020    PAP NIL, HPV neg 02/15/2016    PAP NIL 10/23/2014   NILM, HPV 18 7/11/22     Before the procedure, it was ensured that the patient was educated regarding the nature of her findings to date, the implications of them, and what was to be done. She has been made aware of the role of HPV, the natural history of infection, ways to minimize her future risk, the effect of HPV on the cervix, and treatment options available should they be indicated. The details of the colposcopic procedure were reviewed, as well as the risks of pain, infection and bleeding. All questions were answered before proceeding, and informed consent was therefore obtained.    PROCEDURE:                                                    /70   Ht 1.607 m (5' 3.25\")   Wt 63.8 kg (140 lb 9.6 oz)   LMP 08/23/2020   BMI 24.71 kg/m     Vagina is viewed unenhanced and appears normal. Vagina is then stained with acetic acid and viewed colposcopically.Acetic acid applied with soaking wet cotton balls,  no visible lesions . Lugol's applied with absent uptake in a cobblestone appearance across the upper vagina. Biopsy done Yes. Scant bleeding at      POST PROCEDURE:                                                      IMPRESSION: Patient tolerated procedure well and colposcopy adequate    PLAN : Await the results of the biopsies.  Repeat pap in 12 months.  She  tolerated the procedure well. There were no complications. Patient was discharged in stable condition.    Patient advised to call the clinic if excessive bleeding, pelvic pain, or fever.     Follow-up plan based on pathology " results.    Raquel Morrow MD

## 2022-09-06 NOTE — NURSING NOTE
"Chief Complaint   Patient presents with     Colposcopy       Initial /70   Ht 1.607 m (5' 3.25\")   Wt 63.8 kg (140 lb 9.6 oz)   LMP 08/23/2020   BMI 24.71 kg/m   Estimated body mass index is 24.71 kg/m  as calculated from the following:    Height as of this encounter: 1.607 m (5' 3.25\").    Weight as of this encounter: 63.8 kg (140 lb 9.6 oz).  BP completed using cuff size: regular    Questioned patient about current smoking habits.  Pt. has never smoked.      No obstetric history on file.    The following HM Due: NONE               "

## 2022-09-07 LAB
PATH REPORT.COMMENTS IMP SPEC: NORMAL
PATH REPORT.COMMENTS IMP SPEC: NORMAL
PATH REPORT.FINAL DX SPEC: NORMAL
PATH REPORT.GROSS SPEC: NORMAL
PATH REPORT.MICROSCOPIC SPEC OTHER STN: NORMAL
PATH REPORT.RELEVANT HX SPEC: NORMAL
PHOTO IMAGE: NORMAL

## 2022-09-10 ENCOUNTER — HEALTH MAINTENANCE LETTER (OUTPATIENT)
Age: 52
End: 2022-09-10

## 2022-09-12 ENCOUNTER — PATIENT OUTREACH (OUTPATIENT)
Dept: OBGYN | Facility: CLINIC | Age: 52
End: 2022-09-12

## 2023-06-15 DIAGNOSIS — E89.0 POSTOPERATIVE HYPOTHYROIDISM: ICD-10-CM

## 2023-06-15 RX ORDER — LEVOTHYROXINE SODIUM 100 UG/1
100 TABLET ORAL DAILY
Qty: 90 TABLET | Refills: 0 | Status: SHIPPED | OUTPATIENT
Start: 2023-06-15 | End: 2023-08-08

## 2023-06-15 NOTE — TELEPHONE ENCOUNTER
Routing refill request to provider for review/approval because:  TSH   Date Value Ref Range Status   06/07/2022 0.05 (L) 0.40 - 4.00 mU/L Final   02/26/2021 0.12 (L) 0.40 - 4.00 mU/L Final     Team please call to schedule  yearly with provider     Rajani Garcia RN

## 2023-06-15 NOTE — LETTER
June 20, 2023      Gale Willett  [address] [facility] [address]        Gale Willett      We recently received a refill request for your levothyroxine (SYNTHROID/LEVOTHROID) 100 MCG tablet. We have sent in a refill for you to your pharmacy.    Our records show you are due for a follow up visit with your provider.     Please call the clinic at [phone] to schedule as the providers are booking out.      Sincerely,        Laurie Ortiz/

## 2023-07-23 ENCOUNTER — HEALTH MAINTENANCE LETTER (OUTPATIENT)
Age: 53
End: 2023-07-23

## 2023-07-24 ENCOUNTER — TRANSFERRED RECORDS (OUTPATIENT)
Dept: HEALTH INFORMATION MANAGEMENT | Facility: CLINIC | Age: 53
End: 2023-07-24
Payer: COMMERCIAL

## 2023-07-29 ENCOUNTER — NURSE TRIAGE (OUTPATIENT)
Dept: NURSING | Facility: CLINIC | Age: 53
End: 2023-07-29
Payer: COMMERCIAL

## 2023-07-29 NOTE — TELEPHONE ENCOUNTER
Pt is phoning to get her Levothyroxin filled and is currently out to state    Pt will be contacting provider through My Chart to request medication     Per disposition: Call PCP When Office is Open     Care advice given per protocol and when to call back. Pt verbalized understanding and agrees to plan of care.    Fannie Edgar RN  [place] Nurse Advisor  11:28 AM 7/29/2023          Reason for Disposition   [1] Caller requesting NON-URGENT health information AND [2] PCP's office is the best resource    Additional Information   Negative: [1] Caller is not with the adult (patient) AND [2] reporting urgent symptoms   Negative: Lab result questions   Negative: Medication questions   Negative: Caller can't be reached by phone   Negative: Caller has already spoken to PCP or another triager   Negative: RN needs further essential information from caller in order to complete triage   Negative: Requesting regular office appointment    Protocols used: Information Only Call - No Triage-A-

## 2023-08-07 ENCOUNTER — TRANSFERRED RECORDS (OUTPATIENT)
Dept: HEALTH INFORMATION MANAGEMENT | Facility: CLINIC | Age: 53
End: 2023-08-07
Payer: COMMERCIAL

## 2023-08-08 DIAGNOSIS — E89.0 POSTOPERATIVE HYPOTHYROIDISM: ICD-10-CM

## 2023-08-08 NOTE — TELEPHONE ENCOUNTER
Routing refill request to provider for review/approval because:  Ramona given x1 and patient did not follow up, please advise  Labs out of range & Labs not current:    Patient needs to be seen because it has been more than 1 year since last office visit.  TSH   Date Value Ref Range Status   06/07/2022 0.05 (L) 0.40 - 4.00 mU/L Final   02/26/2021 0.12 (L) 0.40 - 4.00 mU/L Final       Pt has appt 9/6/23 and would like labs before appt as well.    Ginny ORTEGA RN

## 2023-08-08 NOTE — TELEPHONE ENCOUNTER
Medication Question or Refill    Contacts         Type Contact Phone/Fax    08/08/2023 04:36 PM CDT Phone (Incoming) Gale Willett (Self) [phone] (M)            What medication are you calling about (include dose and sig)?: LEVOTHYROXINE    Preferred Pharmacy:  [facility] [id number] [place] [facility] [place] [id number] [facility] [address]  Phone: [phone] Fax: [phone]      Controlled Substance Agreement on file:   CSA -- Patient Level:    CSA: None found at the patient level.       Who prescribed the medication?: YOLA CANNON MD    Do you need a refill? Yes    When did you use the medication last? 8/8/23    Patient offered an appointment? Yes: 9/6/23    Do you have any questions or concerns?  Yes: PT WOULD LIKE TO HAVE LABS DONE BEFORE [facility] ON 9/6/23      Could we send this information to you in [facility] or would you prefer to receive a phone call?:   Patient would prefer a phone call   Okay to leave a detailed message?: Yes at Cell number on file:    Telephone Information:   Mobile [phone]

## 2023-08-10 RX ORDER — LEVOTHYROXINE SODIUM 100 UG/1
100 TABLET ORAL DAILY
Qty: 30 TABLET | Refills: 0 | Status: SHIPPED | OUTPATIENT
Start: 2023-08-10 | End: 2023-09-18

## 2023-08-21 ENCOUNTER — ANCILLARY PROCEDURE (OUTPATIENT)
Dept: MAMMOGRAPHY | Facility: CLINIC | Age: 53
End: 2023-08-21
Attending: NURSE PRACTITIONER
Payer: COMMERCIAL

## 2023-08-21 DIAGNOSIS — Z12.31 VISIT FOR SCREENING MAMMOGRAM: ICD-10-CM

## 2023-08-21 PROCEDURE — 77067 SCR MAMMO BI INCL CAD: CPT | Mod: TC | Performed by: RADIOLOGY

## 2023-08-21 PROCEDURE — 77063 BREAST TOMOSYNTHESIS BI: CPT | Mod: TC | Performed by: RADIOLOGY

## 2023-09-18 DIAGNOSIS — E89.0 POSTOPERATIVE HYPOTHYROIDISM: ICD-10-CM

## 2023-09-18 RX ORDER — LEVOTHYROXINE SODIUM 100 UG/1
100 TABLET ORAL DAILY
Qty: 30 TABLET | Refills: 0 | Status: SHIPPED | OUTPATIENT
Start: 2023-09-18 | End: 2023-10-18

## 2023-09-18 NOTE — TELEPHONE ENCOUNTER
Medication Question or Refill        What medication are you calling about (include dose and sig)?: Levothyroxine 100 MCG    Preferred Pharmacy:   [facility] [id number] [place] [address] [id number] [facility] [address]  Phone: [phone] Fax: [phone]      Controlled Substance Agreement on file:   CSA -- Patient Level:    CSA: None found at the patient level.       Who prescribed the medication?:  Dr. Benitez    Do you need a refill? Yes    When did you use the medication last? today    Patient offered an appointment? Yes:  10/18/23 with Mary Nguyen    Do you have any questions or concerns?  Yes: patient states she will be out of her meds soon. She only has 10 days left. She is requesting a refill to get her by until her appt on 10/18.       Could we send this information to you in [place] or would you prefer to receive a phone call?:   Patient would prefer a phone call   Okay to leave a detailed message?: Yes at Cell number on file:    Telephone Information:   Mobile [phone]

## 2023-10-17 DIAGNOSIS — E89.0 POSTOPERATIVE HYPOTHYROIDISM: ICD-10-CM

## 2023-10-17 RX ORDER — LEVOTHYROXINE SODIUM 100 UG/1
100 TABLET ORAL DAILY
Qty: 30 TABLET | Refills: 0 | Status: CANCELLED | OUTPATIENT
Start: 2023-10-17

## 2023-10-18 ENCOUNTER — OFFICE VISIT (OUTPATIENT)
Dept: FAMILY MEDICINE | Facility: CLINIC | Age: 53
End: 2023-10-18
Payer: COMMERCIAL

## 2023-10-18 ENCOUNTER — MYC MEDICAL ADVICE (OUTPATIENT)
Dept: FAMILY MEDICINE | Facility: CLINIC | Age: 53
End: 2023-10-18

## 2023-10-18 VITALS
WEIGHT: 146 LBS | DIASTOLIC BLOOD PRESSURE: 68 MMHG | OXYGEN SATURATION: 99 % | RESPIRATION RATE: 16 BRPM | SYSTOLIC BLOOD PRESSURE: 118 MMHG | BODY MASS INDEX: 25.87 KG/M2 | HEART RATE: 63 BPM | HEIGHT: 63 IN | TEMPERATURE: 98.1 F

## 2023-10-18 DIAGNOSIS — Z13.220 SCREENING FOR LIPID DISORDERS: ICD-10-CM

## 2023-10-18 DIAGNOSIS — E66.09 OBESITY DUE TO EXCESS CALORIES, UNSPECIFIED CLASSIFICATION, UNSPECIFIED WHETHER SERIOUS COMORBIDITY PRESENT: Primary | ICD-10-CM

## 2023-10-18 DIAGNOSIS — E89.0 POSTOPERATIVE HYPOTHYROIDISM: Primary | ICD-10-CM

## 2023-10-18 DIAGNOSIS — Z00.00 ROUTINE GENERAL MEDICAL EXAMINATION AT A HEALTH CARE FACILITY: ICD-10-CM

## 2023-10-18 DIAGNOSIS — Z12.4 CERVICAL CANCER SCREENING: ICD-10-CM

## 2023-10-18 PROCEDURE — 99213 OFFICE O/P EST LOW 20 MIN: CPT | Mod: 25 | Performed by: NURSE PRACTITIONER

## 2023-10-18 PROCEDURE — 99396 PREV VISIT EST AGE 40-64: CPT | Performed by: NURSE PRACTITIONER

## 2023-10-18 RX ORDER — LEVOTHYROXINE SODIUM 100 UG/1
100 TABLET ORAL DAILY
Qty: 90 TABLET | Refills: 3 | Status: SHIPPED | OUTPATIENT
Start: 2023-10-18 | End: 2024-03-26

## 2023-10-18 SDOH — HEALTH STABILITY: PHYSICAL HEALTH: ON AVERAGE, HOW MANY DAYS PER WEEK DO YOU ENGAGE IN MODERATE TO STRENUOUS EXERCISE (LIKE A BRISK WALK)?: 4 DAYS

## 2023-10-18 ASSESSMENT — ENCOUNTER SYMPTOMS
HEADACHES: 0
SORE THROAT: 0
MYALGIAS: 0
EYE PAIN: 0
CHILLS: 0
HEARTBURN: 0
DIARRHEA: 0
DYSURIA: 0
FEVER: 0
JOINT SWELLING: 0
SHORTNESS OF BREATH: 0
NERVOUS/ANXIOUS: 0
CONSTIPATION: 0
HEMATURIA: 0
PALPITATIONS: 0
COUGH: 0
PARESTHESIAS: 0
ABDOMINAL PAIN: 0
BREAST MASS: 0
ARTHRALGIAS: 0
FREQUENCY: 0
NAUSEA: 0
HEMATOCHEZIA: 0
DIZZINESS: 0

## 2023-10-18 ASSESSMENT — SOCIAL DETERMINANTS OF HEALTH (SDOH)
HOW OFTEN DO YOU ATTENT MEETINGS OF THE CLUB OR ORGANIZATION YOU BELONG TO?: MORE THAN 4 TIMES PER YEAR
DO YOU BELONG TO ANY CLUBS OR ORGANIZATIONS SUCH AS CHURCH GROUPS UNIONS, FRATERNAL OR ATHLETIC GROUPS, OR SCHOOL GROUPS?: YES
IN A TYPICAL WEEK, HOW MANY TIMES DO YOU TALK ON THE PHONE WITH FAMILY, FRIENDS, OR NEIGHBORS?: THREE TIMES A WEEK

## 2023-10-18 ASSESSMENT — LIFESTYLE VARIABLES: HOW OFTEN DO YOU HAVE A DRINK CONTAINING ALCOHOL: 2-3 TIMES A WEEK

## 2023-10-18 ASSESSMENT — PAIN SCALES - GENERAL: PAINLEVEL: NO PAIN (0)

## 2023-10-18 NOTE — PROGRESS NOTES
SUBJECTIVE:   CC: Gale is an 53 year old who presents for preventive health visit.       10/18/2023    11:06 AM   Additional Questions   Roomed by Cheli TAMAYO       Healthy Habits:     Getting at least 3 servings of Calcium per day:  NO    Bi-annual eye exam:  Yes    Dental care twice a year:  Yes    Sleep apnea or symptoms of sleep apnea:  None    Diet:  Regular (no restrictions)    Frequency of exercise:  4-5 days/week    Duration of exercise:  45-60 minutes    Taking medications regularly:  Yes    Medication side effects:  Not applicable    Additional concerns today:  Yes    She is here for physical, she needs to have lab work done, she feels she needs to have a US of thyroid done she feels it is over due and that it has been about 3 years. She is up to date on mammogram. She has knee concerns and she is followed by [facility], she is having swelling and she is a runner.     Today's PHQ-2 Score:       10/18/2023    10:55 AM   PHQ-2 ( 1999 [employer])   Q1: Little interest or pleasure in doing things 0   Q2: Feeling down, depressed or hopeless 0   PHQ-2 Score 0   Q1: Little interest or pleasure in doing things Not at all   Q2: Feeling down, depressed or hopeless Not at all   PHQ-2 Score 0       Social History     Tobacco Use    Smoking status: Never     Passive exposure: Never    Smokeless tobacco: Never   Substance Use Topics    Alcohol use: Yes     Alcohol/week: 0.0 standard drinks of alcohol     Comment: 1-2 times per week (one glass of wine)             10/18/2023    10:55 AM   Alcohol Use   Prescreen: >3 drinks/day or >7 drinks/week? No     Reviewed orders with patient.  Reviewed health maintenance and updated orders accordingly - Yes  Lab work is in process  Labs reviewed in EPIC    Breast Cancer Screening:   Up to date  FHS-7:       5/24/2022     9:12 AM 7/25/2022     7:59 AM 8/21/2023     8:23 AM 10/18/2023    11:00 AM   Breast CA Risk Assessment (FHS-7)   Did any of your first-degree relatives have  breast or ovarian cancer? No No No No   Did any of your relatives have bilateral breast cancer? Yes No No No   Did any man in your family have breast cancer? No No No No   Did any woman in your family have breast and ovarian cancer? Yes No No Yes   Did any woman in your family have breast cancer before age 50 y? Yes No Yes Yes   Do you have 2 or more relatives with breast and/or ovarian cancer? No No No No   Do you have 2 or more relatives with breast and/or bowel cancer? No No No No         Pertinent mammograms are reviewed under the imaging tab.    History of abnormal Pap smear: NO - age 30-65 PAP every 5 years with negative HPV co-testing recommended      Latest Ref Rng & Units 2022     9:26 AM 3/10/2020     9:15 AM 3/10/2020     9:13 AM   PAP / HPV   PAP  Negative for Intraepithelial Lesion or Malignancy (NILM)      PAP (Historical)    LSIL    HPV 16 DNA Negative Negative  Negative     HPV 18 DNA Negative Positive  Negative     Other HR HPV Negative Negative  Positive       Reviewed and updated as needed this visit by clinical staff   Tobacco  Allergies  Meds              Reviewed and updated as needed this visit by Provider                 Past Medical History:   Diagnosis Date    Abnormal Pap smear of cervix 03/10/2020    see problem list    Cancer (H)     thyroid cancer- age 40 s/p partial thyroidectomy    High risk HPV infection 10/23/14, 3/10/20    Not HPV 16 or 18    PONV (postoperative nausea and vomiting)     Thyroid disease     hx thyroid cancer      Past Surgical History:   Procedure Laterality Date    BIOPSY  2012    Neck     SECTION      ENT SURGERY  2009    partial thyroidectomy    LAPAROSCOPIC HYSTERECTOMY TOTAL, SALPINGECTOMY BILATERAL Bilateral 2020    Procedure: TOTAL LAPAROSCOPIC HYSTERECTOMY, BILATERAL SALPINGECTOMY;  Surgeon: Natasha Don MD;  Location: [facility] OR     OB History   No obstetric history on file.       Review of Systems   Constitutional:  Negative  "for chills and fever.   HENT:  Negative for congestion, ear pain, hearing loss and sore throat.    Eyes:  Negative for pain and visual disturbance.   Respiratory:  Negative for cough and shortness of breath.    Cardiovascular:  Negative for chest pain, palpitations and peripheral edema.   Gastrointestinal:  Negative for abdominal pain, constipation, diarrhea, heartburn, hematochezia and nausea.   Breasts:  Negative for tenderness, breast mass and discharge.   Genitourinary:  Negative for dysuria, frequency, genital sores, hematuria, pelvic pain, urgency, vaginal bleeding and vaginal discharge.   Musculoskeletal:  Negative for arthralgias, joint swelling and myalgias.   Skin:  Negative for rash.   Neurological:  Negative for dizziness, headaches and paresthesias.   Psychiatric/Behavioral:  Negative for mood changes. The patient is not nervous/anxious.           OBJECTIVE:   /68 (BP Location: Right arm, Patient Position: Sitting, Cuff Size: Adult Regular)   Pulse 63   Temp 98.1  F (36.7  C) (Oral)   Resp 16   Ht 1.607 m (5' 3.25\")   Wt 66.2 kg (146 lb)   LMP 08/23/2020   SpO2 99%   BMI 25.66 kg/m    Physical Exam  GENERAL: healthy, alert and no distress  NECK: no adenopathy, no asymmetry, masses, or scars and thyroid normal to palpation  RESP: lungs clear to auscultation - no rales, rhonchi or wheezes  CV: regular rate and rhythm, normal S1 S2, no S3 or S4, no murmur, click or rub, no peripheral edema and peripheral pulses strong  ABDOMEN: soft, nontender, no hepatosplenomegaly, no masses and bowel sounds normal  MS: no gross musculoskeletal defects noted, no edema  SKIN: no suspicious lesions or rashes  NEURO: Normal strength and tone, mentation intact and speech normal  PSYCH: mentation appears normal, affect normal/bright    Diagnostic Test Results:  Labs reviewed in Epic    ASSESSMENT/PLAN:   Glae was seen today for physical.    Diagnoses and all orders for this visit:    Postoperative " "hypothyroidism  -     TSH WITH FREE T4 REFLEX; Future  -     US Head Neck Soft Tissue; Future  -     levothyroxine (SYNTHROID/LEVOTHROID) 100 MCG tablet; Take 1 tablet (100 mcg) by mouth daily    Cervical cancer screening  - deferred   Routine general medical examination at a health care facility  -     CBC with platelets; Future  -     Comprehensive metabolic panel (BMP + Alb, Alk Phos, ALT, AST, Total. Bili, TP); Future    Screening for lipid disorders  -     Lipid panel reflex to direct LDL Fasting; Future    Other orders  -     REVIEW OF HEALTH MAINTENANCE PROTOCOL ORDERS        Patient has been advised of split billing requirements and indicates understanding: Yes      COUNSELING:  Reviewed preventive health counseling, as reflected in patient instructions       Regular exercise       Healthy diet/nutrition      BMI:   Estimated body mass index is 25.66 kg/m  as calculated from the following:    Height as of this encounter: 1.607 m (5' 3.25\").    Weight as of this encounter: 66.2 kg (146 lb).   Weight management plan: Discussed healthy diet and exercise guidelines      She reports that she has never smoked. She has never been exposed to tobacco smoke. She has never used smokeless tobacco.          Wendy Hernandez NP  [facility]  "

## 2023-10-19 ENCOUNTER — LAB (OUTPATIENT)
Dept: LAB | Facility: CLINIC | Age: 53
End: 2023-10-19
Payer: COMMERCIAL

## 2023-10-19 DIAGNOSIS — Z00.00 ROUTINE GENERAL MEDICAL EXAMINATION AT A HEALTH CARE FACILITY: ICD-10-CM

## 2023-10-19 DIAGNOSIS — E89.0 POSTOPERATIVE HYPOTHYROIDISM: ICD-10-CM

## 2023-10-19 DIAGNOSIS — Z13.220 SCREENING FOR LIPID DISORDERS: ICD-10-CM

## 2023-10-19 LAB
ALBUMIN SERPL BCG-MCNC: 4.7 G/DL (ref 3.5–5.2)
ALP SERPL-CCNC: 77 U/L (ref 35–104)
ALT SERPL W P-5'-P-CCNC: 20 U/L (ref 0–50)
ANION GAP SERPL CALCULATED.3IONS-SCNC: 10 MMOL/L (ref 7–15)
AST SERPL W P-5'-P-CCNC: 26 U/L (ref 0–45)
BILIRUB SERPL-MCNC: 0.6 MG/DL
BUN SERPL-MCNC: 12 MG/DL (ref 6–20)
CALCIUM SERPL-MCNC: 9.7 MG/DL (ref 8.6–10)
CHLORIDE SERPL-SCNC: 102 MMOL/L (ref 98–107)
CHOLEST SERPL-MCNC: 211 MG/DL
CREAT SERPL-MCNC: 0.83 MG/DL (ref 0.51–0.95)
DEPRECATED HCO3 PLAS-SCNC: 27 MMOL/L (ref 22–29)
EGFRCR SERPLBLD CKD-EPI 2021: 83 ML/MIN/1.73M2
ERYTHROCYTE [DISTWIDTH] IN BLOOD BY AUTOMATED COUNT: 12.5 % (ref 10–15)
GLUCOSE SERPL-MCNC: 93 MG/DL (ref 70–99)
HCT VFR BLD AUTO: 43.1 % (ref 35–47)
HDLC SERPL-MCNC: 89 MG/DL
HGB BLD-MCNC: 14.4 G/DL (ref 11.7–15.7)
LDLC SERPL CALC-MCNC: 110 MG/DL
MCH RBC QN AUTO: 30.6 PG (ref 26.5–33)
MCHC RBC AUTO-ENTMCNC: 33.4 G/DL (ref 31.5–36.5)
MCV RBC AUTO: 92 FL (ref 78–100)
NONHDLC SERPL-MCNC: 122 MG/DL
PLATELET # BLD AUTO: 188 10E3/UL (ref 150–450)
POTASSIUM SERPL-SCNC: 4.2 MMOL/L (ref 3.4–5.3)
PROT SERPL-MCNC: 7.4 G/DL (ref 6.4–8.3)
RBC # BLD AUTO: 4.7 10E6/UL (ref 3.8–5.2)
SODIUM SERPL-SCNC: 139 MMOL/L (ref 135–145)
T4 FREE SERPL-MCNC: 2.02 NG/DL (ref 0.9–1.7)
TRIGL SERPL-MCNC: 60 MG/DL
TSH SERPL DL<=0.005 MIU/L-ACNC: 0.14 UIU/ML (ref 0.3–4.2)
WBC # BLD AUTO: 4.1 10E3/UL (ref 4–11)

## 2023-10-19 PROCEDURE — 80053 COMPREHEN METABOLIC PANEL: CPT

## 2023-10-19 PROCEDURE — 84443 ASSAY THYROID STIM HORMONE: CPT

## 2023-10-19 PROCEDURE — 36415 COLL VENOUS BLD VENIPUNCTURE: CPT

## 2023-10-19 PROCEDURE — 80061 LIPID PANEL: CPT

## 2023-10-19 PROCEDURE — 85027 COMPLETE CBC AUTOMATED: CPT

## 2023-10-19 PROCEDURE — 84439 ASSAY OF FREE THYROXINE: CPT

## 2023-10-23 DIAGNOSIS — E05.90 HYPERTHYROIDISM: Primary | ICD-10-CM

## 2023-10-24 NOTE — TELEPHONE ENCOUNTER
I did discuss with the patient and I will send the Rx for the medications  Wendy Hernandez NP on 10/24/2023 at 5:44 PM

## 2023-10-26 ENCOUNTER — PATIENT OUTREACH (OUTPATIENT)
Dept: OBGYN | Facility: CLINIC | Age: 53
End: 2023-10-26
Payer: COMMERCIAL

## 2023-10-26 DIAGNOSIS — D06.9 SEVERE DYSPLASIA OF CERVIX (CIN III): Primary | ICD-10-CM

## 2023-10-30 ENCOUNTER — TELEPHONE (OUTPATIENT)
Dept: ENDOCRINOLOGY | Facility: CLINIC | Age: 53
End: 2023-10-30
Payer: COMMERCIAL

## 2023-10-30 DIAGNOSIS — E89.0 POSTOPERATIVE HYPOTHYROIDISM: Primary | ICD-10-CM

## 2023-10-30 NOTE — LETTER
November 13, 2023      Gale Willett  [address] [address]        Dear ,    We are writing to inform you of your test results.    ***    TSH   Date Value Ref Range Status   10/19/2023 0.14 (L) 0.30 - 4.20 uIU/mL Final   06/07/2022 0.05 (L) 0.40 - 4.00 mU/L Final   02/26/2021 0.12 (L) 0.40 - 4.00 mU/L Final       If you have any questions or concerns, please call the clinic at the number listed above.       Sincerely,                  
NAUSEA/PAIN

## 2023-10-30 NOTE — TELEPHONE ENCOUNTER
Pended for triage.   Emma Fernandez, RN on 10/30/2023 at 2:09 PM                   RE                Reason for Call: Appointment Intake    Referring Provider Name: JORGE LUIS MILLIGAN   Diagnosis and/or Symptoms: E05.90 (ICD-10-CM) - Hyperthyroidism per protocol schedule within one week. No availabilities at this time before 3/26/24 with Dr. Dunbar please review

## 2023-10-31 ENCOUNTER — HOSPITAL ENCOUNTER (OUTPATIENT)
Dept: ULTRASOUND IMAGING | Facility: CLINIC | Age: 53
Discharge: HOME | End: 2023-10-31
Attending: NURSE PRACTITIONER | Admitting: NURSE PRACTITIONER
Payer: COMMERCIAL

## 2023-10-31 DIAGNOSIS — E89.0 POSTOPERATIVE HYPOTHYROIDISM: ICD-10-CM

## 2023-10-31 PROCEDURE — 76536 US EXAM OF HEAD AND NECK: CPT

## 2023-10-31 NOTE — TELEPHONE ENCOUNTER
Endocrine triage  On levothyroxine.   The scheduled first available endocrine appointment timeframe is acceptable.  E-consult may be an option for answer to specific question by the referring provider.  E-consults generally have a response within 3 days.  Marlen Arango MD

## 2023-11-07 RX ORDER — LEVOTHYROXINE SODIUM 75 UG/1
75 TABLET ORAL DAILY
Qty: 30 TABLET | Refills: 1 | Status: SHIPPED | OUTPATIENT
Start: 2023-11-07 | End: 2023-12-04

## 2023-11-08 NOTE — TELEPHONE ENCOUNTER
Triage please call patient and have her adjust her thyroid medication to the new Rx that I am going to send in for 75 mcg a day and then we should recheck in 6 weeks.   I will place the orders  Wendy Hernandez NP on 11/7/2023 at 9:35 PM

## 2023-11-08 NOTE — TELEPHONE ENCOUNTER
I will adjust the medication and I am sorry for the mistake I made for the referral. I will work to get these numbers in better control.   Thank you for your time  Wendy Hernandez NP on 11/7/2023 at 9:34 PM

## 2023-11-09 NOTE — TELEPHONE ENCOUNTER
[employer] sent with information below. Advised to call clinic or reply to [place] if any questions or concerns.    Ginny ORTEGA RN

## 2023-11-13 NOTE — TELEPHONE ENCOUNTER
Patient calls back.     Advised of notes below and [employer] message. Patient will  new dose of levothyroxine and scheduled 6 week lab appt through her [employer].     LANETTE PERES RN on 11/13/2023 at 12:09 PM   [facility]

## 2023-12-04 DIAGNOSIS — E89.0 POSTOPERATIVE HYPOTHYROIDISM: ICD-10-CM

## 2023-12-04 RX ORDER — LEVOTHYROXINE SODIUM 75 UG/1
75 TABLET ORAL DAILY
Qty: 30 TABLET | Refills: 1 | Status: SHIPPED | OUTPATIENT
Start: 2023-12-04 | End: 2024-02-05

## 2024-01-02 PROBLEM — D06.9 SEVERE DYSPLASIA OF CERVIX (CIN III): Status: ACTIVE | Noted: 2020-03-10

## 2024-01-02 NOTE — TELEPHONE ENCOUNTER
Murtaza Don,   Patient is lost to pap tracking follow-up. Attempts to contact pt have been made per reminder process and there has been no reply and/or no appt scheduled.     Pap Hx:  10/23/14 NIL pap, + HR HPV (not 16/18). Cotest in 1 year  2/15/16 Dx pap NIL with Neg HPV. Plan: cotest in 3 years.   3/10/20 LSIL pap, + HR HPV (not 16/18). Plan colp  06/08/20 [place] Bx - SOPHIA 3, ECC - high grade, EMB - Negative. Plan LEEP. CCT Tracking.  08/14/20 LEEP BX: SOPHIA 3, margins positive. Plan hysterectomy  9/23/20 Hysterectomy, Cervix: negative for residual displasia  11/02/20 Postop. Plan 1 year cotest (due 9/23/21) - Long-term surveillance through 9/2045  3/15/2022 Lost to follow-up for pap tracking  7/11/22 NIL vaginal pap, + HR HPV 18. Plan: vaginal colp by 10/11/22  9/6/22 Vaginal colp bx - negative. Plan cotest in 1 year.   9/7/22 Pt viewed result on [employer].  9/27/23 appt - cotesting deferred  10/26/2023 Reminder MyChart  11/28/2023 Reminder call - spoke to pt  01/2/24 Lost to follow-up for pap tracking, fykimberly routed to provider

## 2024-02-01 DIAGNOSIS — E89.0 POSTOPERATIVE HYPOTHYROIDISM: ICD-10-CM

## 2024-02-01 RX ORDER — LEVOTHYROXINE SODIUM 75 UG/1
75 TABLET ORAL DAILY
Qty: 30 TABLET | Refills: 1 | OUTPATIENT
Start: 2024-02-01

## 2024-02-03 ENCOUNTER — LAB (OUTPATIENT)
Dept: LAB | Facility: CLINIC | Age: 54
End: 2024-02-03
Payer: COMMERCIAL

## 2024-02-03 DIAGNOSIS — E89.0 POSTOPERATIVE HYPOTHYROIDISM: ICD-10-CM

## 2024-02-03 LAB
T4 FREE SERPL-MCNC: 1.52 NG/DL (ref 0.9–1.7)
TSH SERPL DL<=0.005 MIU/L-ACNC: 2.2 UIU/ML (ref 0.3–4.2)

## 2024-02-03 PROCEDURE — 84443 ASSAY THYROID STIM HORMONE: CPT

## 2024-02-03 PROCEDURE — 84439 ASSAY OF FREE THYROXINE: CPT

## 2024-02-03 PROCEDURE — 36415 COLL VENOUS BLD VENIPUNCTURE: CPT

## 2024-02-05 ENCOUNTER — MYC REFILL (OUTPATIENT)
Dept: FAMILY MEDICINE | Facility: CLINIC | Age: 54
End: 2024-02-05
Payer: COMMERCIAL

## 2024-02-05 DIAGNOSIS — E89.0 POSTOPERATIVE HYPOTHYROIDISM: ICD-10-CM

## 2024-02-05 RX ORDER — LEVOTHYROXINE SODIUM 75 UG/1
75 TABLET ORAL DAILY
Qty: 30 TABLET | Refills: 1 | Status: SHIPPED | OUTPATIENT
Start: 2024-02-05 | End: 2024-03-26

## 2024-03-06 NOTE — CONFIDENTIAL NOTE
RECORDS RECEIVED FROM: internal    DATE RECEIVED: 3.26.24    NOTES (FOR ALL VISITS) STATUS DETAILS   OFFICE NOTES from referring provider internal    Wendy Monroy NP      OFFICE NOTES from other specialist internal  10.18.23 [place]      MEDICATION LIST internal     IMAGING      ULTRASOUND (HEAD/NECK) internal  10.31.23    LABS     DIABETES: HBGA1C, CREATININE, FASTING LIPIDS, MICROALBUMIN URINE, POTASSIUM, TSH, T4    THYROID: TSH, T4, CBC, THYRODLONULIN, TOTAL T3, FREE T4, CALCITONIN, CEA internal  T4- 2.3.24  TSH- 2.3.24  Lipd- 10.19.23  CBC- 10.19.23  CMP- 10.19.23  T3- 6/7/22  HBGA1C- 6/7/22

## 2024-03-25 ENCOUNTER — MYC REFILL (OUTPATIENT)
Dept: FAMILY MEDICINE | Facility: CLINIC | Age: 54
End: 2024-03-25
Payer: COMMERCIAL

## 2024-03-25 DIAGNOSIS — E89.0 POSTOPERATIVE HYPOTHYROIDISM: ICD-10-CM

## 2024-03-26 ENCOUNTER — PRE VISIT (OUTPATIENT)
Dept: ENDOCRINOLOGY | Facility: CLINIC | Age: 54
End: 2024-03-26

## 2024-03-26 ENCOUNTER — VIRTUAL VISIT (OUTPATIENT)
Dept: ENDOCRINOLOGY | Facility: CLINIC | Age: 54
End: 2024-03-26
Attending: NURSE PRACTITIONER
Payer: COMMERCIAL

## 2024-03-26 VITALS — WEIGHT: 139 LBS | BODY MASS INDEX: 24.43 KG/M2

## 2024-03-26 DIAGNOSIS — E05.90 HYPERTHYROIDISM: ICD-10-CM

## 2024-03-26 DIAGNOSIS — E89.0 POSTOPERATIVE HYPOTHYROIDISM: ICD-10-CM

## 2024-03-26 DIAGNOSIS — C73 MALIGNANT NEOPLASM OF THYROID GLAND (H): Primary | ICD-10-CM

## 2024-03-26 PROCEDURE — G2211 COMPLEX E/M VISIT ADD ON: HCPCS | Mod: 95 | Performed by: STUDENT IN AN ORGANIZED HEALTH CARE EDUCATION/TRAINING PROGRAM

## 2024-03-26 PROCEDURE — 99204 OFFICE O/P NEW MOD 45 MIN: CPT | Mod: 95 | Performed by: STUDENT IN AN ORGANIZED HEALTH CARE EDUCATION/TRAINING PROGRAM

## 2024-03-26 RX ORDER — LEVOTHYROXINE SODIUM 75 UG/1
75 TABLET ORAL DAILY
Qty: 90 TABLET | Refills: 3 | Status: SHIPPED | OUTPATIENT
Start: 2024-03-26

## 2024-03-26 NOTE — NURSING NOTE
Is the patient currently in the state of [place]? YES    Visit mode:VIDEO    If the visit is dropped, the patient can be reconnected by: VIDEO VISIT: Text to cell phone:   Telephone Information:   Mobile [phone]       Will anyone else be joining the visit? NO  (If patient encounters technical issues they should call [phone])    How would you like to obtain your AVS? MyChart    Are changes needed to the allergy or medication list? No    Reason for visit: Consult and Video Visit    Lorena MCNULTY

## 2024-03-26 NOTE — LETTER
3/26/2024       RE: Gale Willett  [address] [address]     Dear Colleague,    Thank you for referring your patient, Gale Willett, to the [facility] [place] at [facility]. Please see a copy of my visit note below.    Endocrinology Clinic Visit 3/26/2024      Video-Visit Details    Type of service:  Video Visit    Video Start Time (time video started): 2:34 pm    Video End Time (time video stopped): 3:05    Originating Location (pt. Location): Other at work        Distant Location (provider location):  Off-site    Mode of Communication:  Video Conference via [employer]    Physician has received verbal consent for a Video Visit from the patient? Yes    I spent a total of 45 minutes on the date of encounter reviewing medical records, evaluating the patient, coordinating care and documenting in the EHR, as detailed above.      NAME:  Gale Willett  PCP:  Kassi Garcia  MRN:  [id number]  Reason for Consult:  postsurgical hypothyroidism  Requesting Provider:  Wendy Monroy    Chief Complaint     Chief Complaint   Patient presents with    Consult    Video Visit       History of Present Illness     Gale Willett is a 53 year old female who is seen in video visit for exogenous hyperthyroidism, postsurgical hypothyroidism    She moved to [place] in 2014 from [place]. She had a diagnosis of thyroid cancer s/p partial thyroidectomy in 2009. I do not have their records. She said she was getting yearly thyroid US and labs, all stable.      She has been on lt4 at 100 mcg daily since diagnosis, reduced her dose to 75 mcg daily on 10/2023.    All TFT in records reviewed:    Latest Ref Rng 6/7/2022  12:34 PM 10/19/2023  9:04 AM 2/3/2024  10:46 AM   ENDO THYROID LABS-[facility]       TSH 0.40 - 4.00 mU/L 0.05 (L)      TSH 0.30 - 4.20 uIU/mL  0.14 (L)  2.20    Triiodothyronine (T3) 60 - 181 ng/dL 99      FREE T4 0.90 - 1.70 ng/dL  1.55 (H)  2.02 (H)  1.52           No prior radiation exposure.    Family hx: no thyroid cancer.    Last US 10/2023:  ULTRASOUND HEAD/NECK SOFT TISSUE 2023 11:36 AM      HISTORY: Postoperative hypothyroidism.     COMPARISON: None.     FINDINGS: Right thyroid appears homogeneous and measures 2.8 x 0.8 x  0.9 cm. Isthmus is 0.2 cm. Left thyroid is absent.   No focal thyroid nodules identified.   No adenopathy identified. There is a small normal-appearing left neck  lymph node incidentally noted at zone 2.  IMPRESSION: No thyroid nodules on the right side. Left thyroidectomy.  No new mass or worrisome lymph node.          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 She does not smoke.    Problem List     Patient Active Problem List   Diagnosis    Family history of Alzheimer's disease    Postoperative hypothyroidism    History of thyroid cancer    Family history of osteoporosis    Vitamin D insufficiency    Severe dysplasia of cervix (SOPHIA III)    Medial meniscus tear    H/O partial thyroidectomy        Medications     Current Outpatient Medications   Medication    levothyroxine (SYNTHROID/LEVOTHROID) 100 MCG tablet    levothyroxine (SYNTHROID/LEVOTHROID) 75 MCG tablet    Multiple Vitamins-Minerals (MULTIVITAMIN ADULT PO)    Semaglutide-Weight Management (WEGOVY) 0.25 MG/0.5ML pen     No current facility-administered medications for this visit.        Allergies     No Known Allergies    Medical / Surgical History     Past Medical History:   Diagnosis Date    Abnormal Pap smear of cervix 03/10/2020    see problem list    Cancer (H)     thyroid cancer- age 40 s/p partial thyroidectomy    High risk HPV infection 10/23/14, 3/10/20    Not HPV 16 or 18    PONV (postoperative nausea and vomiting)     Thyroid disease     hx thyroid cancer     Past Surgical History:   Procedure Laterality Date    BIOPSY  2012    Neck     SECTION  2003    ENT SURGERY  2009    partial thyroidectomy    LAPAROSCOPIC HYSTERECTOMY TOTAL,  SALPINGECTOMY BILATERAL Bilateral 9/23/2020    Procedure: TOTAL LAPAROSCOPIC HYSTERECTOMY, BILATERAL SALPINGECTOMY;  Surgeon: Natasha Don MD;  Location:  OR       Social History     Social History     Socioeconomic History    Marital status:      Spouse name: Not on file    Number of children: Not on file    Years of education: Not on file    Highest education level: Not on file   Occupational History    Not on file   Tobacco Use    Smoking status: Never     Passive exposure: Never    Smokeless tobacco: Never   Vaping Use    Vaping Use: Never used   Substance and Sexual Activity    Alcohol use: Yes     Alcohol/week: 0.0 standard drinks of alcohol     Comment: 1-2 times per week (one glass of wine)    Drug use: No    Sexual activity: Yes     Partners: Male     Birth control/protection: Female Surgical     Comment: ; twins children   Other Topics Concern    Parent/sibling w/ CABG, MI or angioplasty before 65F 55M? No   Social History Narrative    Not on file     Social Determinants of Health     Financial Resource Strain: Low Risk  (10/18/2023)    Financial Resource Strain     Within the past 12 months, have you or your family members you live with been unable to get utilities (heat, electricity) when it was really needed?: No   Food Insecurity: Low Risk  (10/18/2023)    Food Insecurity     Within the past 12 months, did you worry that your food would run out before you got money to buy more?: No     Within the past 12 months, did the food you bought just not last and you didn t have money to get more?: No   Transportation Needs: Low Risk  (10/18/2023)    Transportation Needs     Within the past 12 months, has lack of transportation kept you from medical appointments, getting your medicines, non-medical meetings or appointments, work, or from getting things that you need?: No   Physical Activity: Unknown (10/18/2023)    Exercise Vital Sign     Days of Exercise per Week: 4 days     Minutes of  Exercise per Session: Not on file   Stress: No Stress Concern Present (10/18/2023)    [employer] [place] - Occupational Stress Questionnaire     Feeling of Stress : Not at all   Social Connections: Unknown (10/18/2023)    Social Connection and Isolation Panel [NHANES]     Frequency of Communication with Friends and Family: Three times a week     Frequency of Social Gatherings with Friends and Family: Not on file     Attends Pentec[employer]alism Services: Not on file     Active Member of Clubs or Organizations: Yes     Attends Club or Organization Meetings: More than 4 times per year     Marital Status: Not on file   Interpersonal Safety: Low Risk  (10/18/2023)    Interpersonal Safety     Do you feel physically and emotionally safe where you currently live?: Yes     Within the past 12 months, have you been hit, slapped, kicked or otherwise physically hurt by someone?: No     Within the past 12 months, have you been humiliated or emotionally abused in other ways by your partner or ex-partner?: No   Housing Stability: Low Risk  (10/18/2023)    Housing Stability     Do you have housing? : Yes     Are you worried about losing your housing?: No       Family History     Family History   Problem Relation Age of Onset    Hypertension Mother     Alzheimer Disease Mother         under age 50    Hyperlipidemia Mother     Cerebrovascular Disease Mother     Hypertension Father     Hyperlipidemia Father     Obesity Father     Colon Cancer No family hx of        ROS     12 ROS completed, pertinent positive and negative in HPI  Weight gain of 10 lbs.    Physical Exam   Wt 63 kg (139 lb)   LMP 08/23/2020   BMI 24.43 kg/m     GENERAL: alert and no distress  EYES: Eyes grossly normal to inspection.  No discharge or erythema, or obvious scleral/conjunctival abnormalities.  RESP: No audible wheeze, cough, or visible cyanosis.    SKIN: Visible skin clear. No significant rash, abnormal pigmentation or lesions.  NEURO: Cranial  "nerves grossly intact.  Mentation and speech appropriate for age.  PSYCH: Appropriate affect, tone, and pace of words     Labs/Imaging     Pertinent Labs were reviewed and updated in EPIC and discussed briefly.  Radiology Results were  reviewed and updated in EPIC and discussed briefly.    Summary of recent findings:   Lab Results   Component Value Date    A1C 5.1 06/07/2022       TSH   Date Value Ref Range Status   02/03/2024 2.20 0.30 - 4.20 uIU/mL Final   10/19/2023 0.14 (L) 0.30 - 4.20 uIU/mL Final   06/07/2022 0.05 (L) 0.40 - 4.00 mU/L Final   02/26/2021 0.12 (L) 0.40 - 4.00 mU/L Final   09/21/2020 0.16 (L) 0.40 - 4.00 mU/L Final   03/10/2020 0.26 (L) 0.40 - 4.00 mU/L Final   10/22/2018 0.74 0.40 - 4.00 mU/L Final   05/17/2017 0.46 0.40 - 4.00 mU/L Final     T4 Free   Date Value Ref Range Status   02/26/2021 1.36 0.76 - 1.46 ng/dL Final   09/21/2020 1.39 0.76 - 1.46 ng/dL Final   03/10/2020 1.48 (H) 0.76 - 1.46 ng/dL Final   10/22/2018 1.41 0.76 - 1.46 ng/dL Final   05/17/2017 1.30 0.76 - 1.46 ng/dL Final     Free T4   Date Value Ref Range Status   02/03/2024 1.52 0.90 - 1.70 ng/dL Final   10/19/2023 2.02 (H) 0.90 - 1.70 ng/dL Final   06/07/2022 1.55 (H) 0.76 - 1.46 ng/dL Final       Creatinine   Date Value Ref Range Status   10/19/2023 0.83 0.51 - 0.95 mg/dL Final   09/21/2020 0.82 0.52 - 1.04 mg/dL Final       Recent Labs   Lab Test 10/19/23  0904 06/07/22  1234   CHOL 211* 199   HDL 89 105   * 84   TRIG 60 48       No results found for: \"[id number]\", \"[id number]\", \"[id number]\"    I personally reviewed the patient's outside records from [facility] EMR and Care Everywhere. Summary of pertinent findings in HPI.    Impression / Plan     1. Postsurgical hypothyroidism  2. Reported hx of thyroid cancer  3. S/p left hemithyroidectomy around 2009.  4. Exogenous hyperthyroidism  I do not have initla records, reportedly low risk thyroid cancer s/p partial thyroidectomy. Last thyroid US 10/2023, no concerning " lymphadenopathy.   She is far out from her surgery , more than 10 years. We could get TG and Tg abx though I do not have previous levels and trend.   She will try to get me initial records to review.  We discussed that with low risk thyroid cancer eventually if stable surveillance we could stop monitoring w regular US. We will plan on checking Tg and TG abx to establish a baseline.  TSH goal: treat to normal level. Dose appropriately adjusted.  I refilled her lt4 at 75 mcg daily.      Test and/or medications prescribed today:  No orders of the defined types were placed in this encounter.        Follow up: 1 year    The longitudinal plan of care for the diagnosis(es)/condition(s) as documented were addressed during this visit. Due to the added complexity in care, I will continue to support Gale in the subsequent management and with ongoing continuity of care.      Veronica Dunbar MD  Endocrinology, Diabetes and Metabolism  [facility]

## 2024-03-26 NOTE — PROGRESS NOTES
Endocrinology Clinic Visit 3/26/2024      Video-Visit Details    Type of service:  Video Visit    Video Start Time (time video started): 2:34 pm    Video End Time (time video stopped): 3:05    Originating Location (pt. Location): Other at work        Distant Location (provider location):  Off-site    Mode of Communication:  Video Conference via [employer]    Physician has received verbal consent for a Video Visit from the patient? Yes    I spent a total of 45 minutes on the date of encounter reviewing medical records, evaluating the patient, coordinating care and documenting in the EHR, as detailed above.      NAME:  Gale Willett  PCP:  Kassi Garcia  MRN:  [id number]  Reason for Consult:  postsurgical hypothyroidism  Requesting Provider:  Wendy Monroy    Chief Complaint     Chief Complaint   Patient presents with    Consult    Video Visit       History of Present Illness     Gale Willett is a 53 year old female who is seen in video visit for exogenous hyperthyroidism, postsurgical hypothyroidism    She moved to [place] in 2014 from [place]. She had a diagnosis of thyroid cancer s/p partial thyroidectomy in 2009. I do not have their records. She said she was getting yearly thyroid US and labs, all stable.      She has been on lt4 at 100 mcg daily since diagnosis, reduced her dose to 75 mcg daily on 10/2023.    All TFT in records reviewed:    Latest Ref Rng 6/7/2022  12:34 PM 10/19/2023  9:04 AM 2/3/2024  10:46 AM   ENDO THYROID LABS-[facility]       TSH 0.40 - 4.00 mU/L 0.05 (L)      TSH 0.30 - 4.20 uIU/mL  0.14 (L)  2.20    Triiodothyronine (T3) 60 - 181 ng/dL 99      FREE T4 0.90 - 1.70 ng/dL 1.55 (H)  2.02 (H)  1.52           No prior radiation exposure.    Family hx: no thyroid cancer.    Last US 10/2023:  ULTRASOUND HEAD/NECK SOFT TISSUE October 31, 2023 11:36 AM      HISTORY: Postoperative hypothyroidism.     COMPARISON: None.     FINDINGS: Right thyroid appears homogeneous and measures 2.8 x 0.8 x  0.9 cm.  Isthmus is 0.2 cm. Left thyroid is absent.   No focal thyroid nodules identified.   No adenopathy identified. There is a small normal-appearing left neck  lymph node incidentally noted at zone 2.  IMPRESSION: No thyroid nodules on the right side. Left thyroidectomy.  No new mass or worrisome lymph node.          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 She does not smoke.    Problem List     Patient Active Problem List   Diagnosis    Family history of Alzheimer's disease    Postoperative hypothyroidism    History of thyroid cancer    Family history of osteoporosis    Vitamin D insufficiency    Severe dysplasia of cervix (SOPHIA III)    Medial meniscus tear    H/O partial thyroidectomy        Medications     Current Outpatient Medications   Medication    levothyroxine (SYNTHROID/LEVOTHROID) 100 MCG tablet    levothyroxine (SYNTHROID/LEVOTHROID) 75 MCG tablet    Multiple Vitamins-Minerals (MULTIVITAMIN ADULT PO)    Semaglutide-Weight Management (WEGOVY) 0.25 MG/0.5ML pen     No current facility-administered medications for this visit.        Allergies     No Known Allergies    Medical / Surgical History     Past Medical History:   Diagnosis Date    Abnormal Pap smear of cervix 03/10/2020    see problem list    Cancer (H)     thyroid cancer- age 40 s/p partial thyroidectomy    High risk HPV infection 10/23/14, 3/10/20    Not HPV 16 or 18    PONV (postoperative nausea and vomiting)     Thyroid disease     hx thyroid cancer     Past Surgical History:   Procedure Laterality Date    BIOPSY  2012    Neck     SECTION      ENT SURGERY  2009    partial thyroidectomy    LAPAROSCOPIC HYSTERECTOMY TOTAL, SALPINGECTOMY BILATERAL Bilateral 2020    Procedure: TOTAL LAPAROSCOPIC HYSTERECTOMY, BILATERAL SALPINGECTOMY;  Surgeon: Natasha Don MD;  Location:  OR       Social History     Social History     Socioeconomic History    Marital status:      Spouse name: Not on file    Number of children: Not on file     Years of education: Not on file    Highest education level: Not on file   Occupational History    Not on file   Tobacco Use    Smoking status: Never     Passive exposure: Never    Smokeless tobacco: Never   Vaping Use    Vaping Use: Never used   Substance and Sexual Activity    Alcohol use: Yes     Alcohol/week: 0.0 standard drinks of alcohol     Comment: 1-2 times per week (one glass of wine)    Drug use: No    Sexual activity: Yes     Partners: Male     Birth control/protection: Female Surgical     Comment: ; twins children   Other Topics Concern    Parent/sibling w/ CABG, MI or angioplasty before 65F 55M? No   Social History Narrative    Not on file     Social Determinants of Health     Financial Resource Strain: Low Risk  (10/18/2023)    Financial Resource Strain     Within the past 12 months, have you or your family members you live with been unable to get utilities (heat, electricity) when it was really needed?: No   Food Insecurity: Low Risk  (10/18/2023)    Food Insecurity     Within the past 12 months, did you worry that your food would run out before you got money to buy more?: No     Within the past 12 months, did the food you bought just not last and you didn t have money to get more?: No   Transportation Needs: Low Risk  (10/18/2023)    Transportation Needs     Within the past 12 months, has lack of transportation kept you from medical appointments, getting your medicines, non-medical meetings or appointments, work, or from getting things that you need?: No   Physical Activity: Unknown (10/18/2023)    Exercise Vital Sign     Days of Exercise per Week: 4 days     Minutes of Exercise per Session: Not on file   Stress: No Stress Concern Present (10/18/2023)    [employer] - Occupational Stress Questionnaire     Feeling of Stress : Not at all   Social Connections: Unknown (10/18/2023)    Social Connection and Isolation Panel [NHANES]     Frequency of Communication with  Friends and Family: Three times a week     Frequency of Social Gatherings with Friends and Family: Not on file     Attends Mandaeism Services: Not on file     Active Member of Clubs or Organizations: Yes     Attends Club or Organization Meetings: More than 4 times per year     Marital Status: Not on file   Interpersonal Safety: Low Risk  (10/18/2023)    Interpersonal Safety     Do you feel physically and emotionally safe where you currently live?: Yes     Within the past 12 months, have you been hit, slapped, kicked or otherwise physically hurt by someone?: No     Within the past 12 months, have you been humiliated or emotionally abused in other ways by your partner or ex-partner?: No   Housing Stability: Low Risk  (10/18/2023)    Housing Stability     Do you have housing? : Yes     Are you worried about losing your housing?: No       Family History     Family History   Problem Relation Age of Onset    Hypertension Mother     Alzheimer Disease Mother         under age 50    Hyperlipidemia Mother     Cerebrovascular Disease Mother     Hypertension Father     Hyperlipidemia Father     Obesity Father     Colon Cancer No family hx of        ROS     12 ROS completed, pertinent positive and negative in HPI  Weight gain of 10 lbs.    Physical Exam   Wt 63 kg (139 lb)   LMP 08/23/2020   BMI 24.43 kg/m     GENERAL: alert and no distress  EYES: Eyes grossly normal to inspection.  No discharge or erythema, or obvious scleral/conjunctival abnormalities.  RESP: No audible wheeze, cough, or visible cyanosis.    SKIN: Visible skin clear. No significant rash, abnormal pigmentation or lesions.  NEURO: Cranial nerves grossly intact.  Mentation and speech appropriate for age.  PSYCH: Appropriate affect, tone, and pace of words     Labs/Imaging     Pertinent Labs were reviewed and updated in EPIC and discussed briefly.  Radiology Results were  reviewed and updated in EPIC and discussed briefly.    Summary of recent findings:   Lab  "Results   Component Value Date    A1C 5.1 06/07/2022       TSH   Date Value Ref Range Status   02/03/2024 2.20 0.30 - 4.20 uIU/mL Final   10/19/2023 0.14 (L) 0.30 - 4.20 uIU/mL Final   06/07/2022 0.05 (L) 0.40 - 4.00 mU/L Final   02/26/2021 0.12 (L) 0.40 - 4.00 mU/L Final   09/21/2020 0.16 (L) 0.40 - 4.00 mU/L Final   03/10/2020 0.26 (L) 0.40 - 4.00 mU/L Final   10/22/2018 0.74 0.40 - 4.00 mU/L Final   05/17/2017 0.46 0.40 - 4.00 mU/L Final     T4 Free   Date Value Ref Range Status   02/26/2021 1.36 0.76 - 1.46 ng/dL Final   09/21/2020 1.39 0.76 - 1.46 ng/dL Final   03/10/2020 1.48 (H) 0.76 - 1.46 ng/dL Final   10/22/2018 1.41 0.76 - 1.46 ng/dL Final   05/17/2017 1.30 0.76 - 1.46 ng/dL Final     Free T4   Date Value Ref Range Status   02/03/2024 1.52 0.90 - 1.70 ng/dL Final   10/19/2023 2.02 (H) 0.90 - 1.70 ng/dL Final   06/07/2022 1.55 (H) 0.76 - 1.46 ng/dL Final       Creatinine   Date Value Ref Range Status   10/19/2023 0.83 0.51 - 0.95 mg/dL Final   09/21/2020 0.82 0.52 - 1.04 mg/dL Final       Recent Labs   Lab Test 10/19/23  0904 06/07/22  1234   CHOL 211* 199   HDL 89 105   * 84   TRIG 60 48       No results found for: \"[id number]\", \"[id number]\", \"[id number]\"    I personally reviewed the patient's outside records from [facility] EMR and Care Everywhere. Summary of pertinent findings in HPI.    Impression / Plan     1. Postsurgical hypothyroidism  2. Reported hx of thyroid cancer  3. S/p left hemithyroidectomy around 2009.  4. Exogenous hyperthyroidism  I do not have initla records, reportedly low risk thyroid cancer s/p partial thyroidectomy. Last thyroid US 10/2023, no concerning lymphadenopathy.   She is far out from her surgery , more than 10 years. We could get TG and Tg abx though I do not have previous levels and trend.   She will try to get me initial records to review.  We discussed that with low risk thyroid cancer eventually if stable surveillance we could stop monitoring w regular US. We will " plan on checking Tg and TG abx to establish a baseline.  TSH goal: treat to normal level. Dose appropriately adjusted.  I refilled her lt4 at 75 mcg daily.      Test and/or medications prescribed today:  No orders of the defined types were placed in this encounter.        Follow up: 1 year    The longitudinal plan of care for the diagnosis(es)/condition(s) as documented were addressed during this visit. Due to the added complexity in care, I will continue to support Gale in the subsequent management and with ongoing continuity of care.      Veronica Dunbar MD  Endocrinology, Diabetes and Metabolism  [facility]

## 2024-03-27 RX ORDER — LEVOTHYROXINE SODIUM 75 UG/1
75 TABLET ORAL DAILY
Qty: 30 TABLET | Refills: 1 | OUTPATIENT
Start: 2024-03-27

## 2024-10-07 ENCOUNTER — OFFICE VISIT (OUTPATIENT)
Dept: FAMILY MEDICINE | Facility: CLINIC | Age: 54
End: 2024-10-07
Payer: COMMERCIAL

## 2024-10-07 VITALS
RESPIRATION RATE: 16 BRPM | SYSTOLIC BLOOD PRESSURE: 136 MMHG | TEMPERATURE: 97.7 F | OXYGEN SATURATION: 100 % | HEIGHT: 63 IN | DIASTOLIC BLOOD PRESSURE: 76 MMHG | HEART RATE: 48 BPM | WEIGHT: 151 LBS | BODY MASS INDEX: 26.75 KG/M2

## 2024-10-07 DIAGNOSIS — J40 BRONCHITIS: Primary | ICD-10-CM

## 2024-10-07 DIAGNOSIS — E66.3 OVERWEIGHT (BMI 25.0-29.9): ICD-10-CM

## 2024-10-07 DIAGNOSIS — R05.2 SUBACUTE COUGH: ICD-10-CM

## 2024-10-07 PROCEDURE — 99214 OFFICE O/P EST MOD 30 MIN: CPT | Performed by: FAMILY MEDICINE

## 2024-10-07 RX ORDER — PREDNISONE 20 MG/1
20 TABLET ORAL DAILY
Qty: 5 TABLET | Refills: 0 | Status: SHIPPED | OUTPATIENT
Start: 2024-10-07

## 2024-10-07 RX ORDER — AZITHROMYCIN 250 MG/1
TABLET, FILM COATED ORAL
Qty: 6 TABLET | Refills: 0 | Status: SHIPPED | OUTPATIENT
Start: 2024-10-07 | End: 2024-10-12

## 2024-10-07 ASSESSMENT — ENCOUNTER SYMPTOMS: COUGH: 1

## 2024-10-07 NOTE — PROGRESS NOTES
"  Assessment & Plan     (J40) Bronchitis  (primary encounter diagnosis)  Comment:   Plan: predniSONE (DELTASONE) 20 MG tablet,         azithromycin (ZITHROMAX) 250 MG tablet            (R05.2) Subacute cough  Comment: post covid   Discussed in detail  Plan: guaiFENesin-codeine (GUAIFENESIN AC) 100-10         MG/5ML syrup            (E66.3) Overweight (BMI 25.0-29.9)  Comment:   Plan: COMPOUNDED NON-CONTROLLED SUBSTANCE (CMPD RX) -        PHARMACY TO MIX COMPOUNDED MEDICATION        Discussed medication medication side effects .          BMI  Estimated body mass index is 26.75 kg/m  as calculated from the following:    Height as of this encounter: 1.6 m (5' 3\").    Weight as of this encounter: 68.5 kg (151 lb).   Weight management plan: Discussed healthy diet and exercise guidelines        Subjective   Gale is a 54 year old, presenting for the following health issues:  Cough (Patient tested positive for covid Sept 6th. Lingering dry cough.)        10/7/2024     4:20 PM   Additional Questions   Roomed by Jennifer MERAZ     History of Present Illness       Reason for visit:  Chronic cough  Symptom onset:  More than a month  Symptoms include:  Cough since august  Symptom intensity:  Severe  Symptom progression:  Worsening  Had these symptoms before:  Yes  Has tried/received treatment for these symptoms:  No  What makes it worse:  Trying to stifle cough   She is taking medications regularly.         Review of Systems  CONSTITUTIONAL: NEGATIVE for fever, chills, change in weight  INTEGUMENTARY/SKIN: NEGATIVE for worrisome rashes, moles or lesions  EYES: NEGATIVE for vision changes or irritation  ENT/MOUTH: NEGATIVE for ear, mouth and throat problems  RESP: NEGATIVE for significant cough or SOB  BREAST: NEGATIVE for masses, tenderness or discharge  CV: NEGATIVE for chest pain, palpitations or peripheral edema  GI: NEGATIVE for nausea, abdominal pain, heartburn, or change in bowel habits  : NEGATIVE for frequency, dysuria, or " "hematuria  MUSCULOSKELETAL: NEGATIVE for significant arthralgias or myalgia  NEURO: NEGATIVE for weakness, dizziness or paresthesias  ENDOCRINE: NEGATIVE for temperature intolerance, skin/hair changes  HEME: NEGATIVE for bleeding problems  PSYCHIATRIC: NEGATIVE for changes in mood or affect      Objective    BP (!) 144/94 (Cuff Size: Adult Regular)   Pulse (!) 48   Temp 97.7  F (36.5  C) (Oral)   Resp 16   Ht 1.6 m (5' 3\")   Wt 68.5 kg (151 lb)   LMP 08/23/2020   SpO2 100%   BMI 26.75 kg/m    Body mass index is 26.75 kg/m .  Physical Exam   GENERAL: alert and no distress  EYES: Eyes grossly normal to inspection, PERRL and conjunctivae and sclerae normal  HENT: ear canals and TM's normal, nose and mouth without ulcers or lesions  NECK: no adenopathy, no asymmetry, masses, or scars  RESP: lungs clear to auscultation - no rales, rhonchi or wheezes  CV: regular rate and rhythm, normal S1 S2, no S3 or S4, no murmur, click or rub, no peripheral edema  ABDOMEN: soft, nontender, no hepatosplenomegaly, no masses and bowel sounds normal  MS: no gross musculoskeletal defects noted, no edema  SKIN: no suspicious lesions or rashes  NEURO: Normal strength and tone, mentation intact and speech normal  PSYCH: mentation appears normal, affect normal/bright          Signed Electronically by: Abbi Bartlett MD    "

## 2024-11-04 ENCOUNTER — MYC REFILL (OUTPATIENT)
Dept: FAMILY MEDICINE | Facility: CLINIC | Age: 54
End: 2024-11-04
Payer: COMMERCIAL

## 2024-11-04 ENCOUNTER — MYC MEDICAL ADVICE (OUTPATIENT)
Dept: FAMILY MEDICINE | Facility: CLINIC | Age: 54
End: 2024-11-04
Payer: COMMERCIAL

## 2024-11-04 DIAGNOSIS — E66.3 OVERWEIGHT (BMI 25.0-29.9): ICD-10-CM

## 2024-11-05 DIAGNOSIS — E66.3 OVERWEIGHT (BMI 25.0-29.9): ICD-10-CM

## 2024-11-05 NOTE — TELEPHONE ENCOUNTER
I have send 0.5 mg to the pharmacy today .   Call the pharmacy to make sure right dose send to you .    Abbi

## 2024-11-23 ENCOUNTER — ANCILLARY PROCEDURE (OUTPATIENT)
Dept: MAMMOGRAPHY | Facility: CLINIC | Age: 54
End: 2024-11-23
Attending: FAMILY MEDICINE
Payer: COMMERCIAL

## 2024-11-23 DIAGNOSIS — Z12.31 VISIT FOR SCREENING MAMMOGRAM: ICD-10-CM

## 2024-11-23 PROCEDURE — 77063 BREAST TOMOSYNTHESIS BI: CPT | Mod: TC | Performed by: RADIOLOGY

## 2024-11-23 PROCEDURE — 77067 SCR MAMMO BI INCL CAD: CPT | Mod: TC | Performed by: RADIOLOGY

## 2024-12-30 ENCOUNTER — MYC REFILL (OUTPATIENT)
Dept: FAMILY MEDICINE | Facility: CLINIC | Age: 54
End: 2024-12-30
Payer: COMMERCIAL

## 2024-12-30 DIAGNOSIS — E66.3 OVERWEIGHT (BMI 25.0-29.9): ICD-10-CM

## 2025-01-21 ENCOUNTER — OFFICE VISIT (OUTPATIENT)
Dept: FAMILY MEDICINE | Facility: CLINIC | Age: 55
End: 2025-01-21
Payer: COMMERCIAL

## 2025-01-21 VITALS
HEART RATE: 88 BPM | SYSTOLIC BLOOD PRESSURE: 130 MMHG | WEIGHT: 127 LBS | HEIGHT: 63 IN | RESPIRATION RATE: 12 BRPM | OXYGEN SATURATION: 100 % | TEMPERATURE: 97.5 F | DIASTOLIC BLOOD PRESSURE: 84 MMHG | BODY MASS INDEX: 22.5 KG/M2

## 2025-01-21 DIAGNOSIS — E66.3 OVERWEIGHT (BMI 25.0-29.9): ICD-10-CM

## 2025-01-21 DIAGNOSIS — Z13.228 SCREENING FOR METABOLIC DISORDER: ICD-10-CM

## 2025-01-21 DIAGNOSIS — Z85.850 HISTORY OF THYROID CANCER: ICD-10-CM

## 2025-01-21 DIAGNOSIS — E89.0 HISTORY OF PARTIAL THYROIDECTOMY: ICD-10-CM

## 2025-01-21 DIAGNOSIS — Z12.11 COLON CANCER SCREENING: ICD-10-CM

## 2025-01-21 DIAGNOSIS — Z00.00 WELL ADULT EXAM: Primary | ICD-10-CM

## 2025-01-21 DIAGNOSIS — Z13.220 LIPID SCREENING: ICD-10-CM

## 2025-01-21 DIAGNOSIS — Z12.4 CERVICAL CANCER SCREENING: ICD-10-CM

## 2025-01-21 PROCEDURE — 36415 COLL VENOUS BLD VENIPUNCTURE: CPT | Performed by: FAMILY MEDICINE

## 2025-01-21 PROCEDURE — 84443 ASSAY THYROID STIM HORMONE: CPT | Performed by: FAMILY MEDICINE

## 2025-01-21 PROCEDURE — 87624 HPV HI-RISK TYP POOLED RSLT: CPT | Performed by: FAMILY MEDICINE

## 2025-01-21 PROCEDURE — 80061 LIPID PANEL: CPT | Performed by: FAMILY MEDICINE

## 2025-01-21 PROCEDURE — 80048 BASIC METABOLIC PNL TOTAL CA: CPT | Performed by: FAMILY MEDICINE

## 2025-01-21 SDOH — HEALTH STABILITY: PHYSICAL HEALTH: ON AVERAGE, HOW MANY DAYS PER WEEK DO YOU ENGAGE IN MODERATE TO STRENUOUS EXERCISE (LIKE A BRISK WALK)?: 6 DAYS

## 2025-01-21 SDOH — HEALTH STABILITY: PHYSICAL HEALTH: ON AVERAGE, HOW MANY MINUTES DO YOU ENGAGE IN EXERCISE AT THIS LEVEL?: 60 MIN

## 2025-01-21 ASSESSMENT — SOCIAL DETERMINANTS OF HEALTH (SDOH): HOW OFTEN DO YOU GET TOGETHER WITH FRIENDS OR RELATIVES?: ONCE A WEEK

## 2025-01-21 NOTE — PROGRESS NOTES
Preventive Care Visit  [facility]  Abbi Bartlett MD, Family Medicine  Jan 21, 2025      Assessment & Plan     (Z00.00) Well adult exam  (primary encounter diagnosis)  Comment: In clinic for annual exam   Doing well , lost significant weight with compounded semaglutide wish to continue medication .     (Z12.4) Cervical cancer screening  Comment: pap obtained and send .  History of hysterectomy   Due to SOPHIA 3 history   Per ob need surveillance   Plan: HPV and Gynecologic Cytology Panel -         Recommended Age 30 - 65 Years, Gynecologic         Cytology (PAP)            (E89.0) History of partial thyroidectomy  Comment: for thyroid cancer   Seen by endo 1 year ,   Follow up scan in 1 year , at that point she will be 15 years since her initial surgery   We discussed okay to hold follow up scan in future .    Plan: TSH WITH FREE T4 REFLEX, CANCELED: TSH with         free T4 reflex            (E66.3) Overweight (BMI 25.0-29.9)  Comment: discussed as she reach a goal weight   Okay to wean from the medication .    Plan: COMPOUNDED NON-CONTROLLED SUBSTANCE (CMPD RX) -        PHARMACY TO MIX COMPOUNDED MEDICATION          (Z85.850) History of thyroid cancer  Comment:   Plan: US Thyroid            (Z13.228) Screening for metabolic disorder  Comment:   Plan: Basic metabolic panel  (Ca, Cl, CO2, Creat,         Gluc, K, Na, BUN)            (Z13.220) Lipid screening  Comment:   Plan: Lipid panel reflex to direct LDL Fasting            (Z12.11) Colon cancer screening  Comment:   Plan: Colonoscopy Screening  Referral         Involved in patient care and coordination .      Counseling  Appropriate preventive services were addressed with this patient via screening, questionnaire, or discussion as appropriate for fall prevention, nutrition, physical activity, Tobacco-use cessation, social engagement, weight loss and cognition.  Checklist reviewing preventive services available has been given to the  patient.  Reviewed patient's diet, addressing concerns and/or questions.         Subjective   Gale is a 54 year old, presenting for the following:  Physical        1/21/2025     9:34 AM   Additional Questions   Roomed by Jennifer MARTINEZ    Total laparoscopic hysterectomy  Bilateral salpingectomy  SOPHIA 3, s/p LEEP with positive margins   Per ob need 20 Year survillance     Health Care Directive  Patient does not have a Health Care Directive:       1/21/2025   General Health   How would you rate your overall physical health? Excellent   Feel stress (tense, anxious, or unable to sleep) Not at all         1/21/2025   Nutrition   Three or more servings of calcium each day? (!) NO   Diet: Low fat/cholesterol   How many servings of fruit and vegetables per day? (!) 2-3   How many sweetened beverages each day? 0-1         1/21/2025   Exercise   Days per week of moderate/strenous exercise 6 days   Average minutes spent exercising at this level 60 min         1/21/2025   Social Factors   Frequency of gathering with friends or relatives Once a week   Worry food won't last until get money to buy more No   Food not last or not have enough money for food? No   Do you have housing? (Housing is defined as stable permanent housing and does not include staying ouside in a car, in a tent, in an abandoned building, in an overnight shelter, or couch-surfing.) Yes   Are you worried about losing your housing? No   Lack of transportation? No   Unable to get utilities (heat,electricity)? No         1/21/2025   Fall Risk   Fallen 2 or more times in the past year? No   Trouble with walking or balance? No          1/21/2025   Dental   Dentist two times every year? Yes         1/21/2025   TB Screening   Were you born outside of the [place]? Yes         Today's PHQ-2 Score:       1/21/2025     9:31 AM   PHQ-2 ( 1999 [employer])   Q1: Little interest or pleasure in doing things 0   Q2: Feeling down, depressed or hopeless 0   PHQ-2 Score 0    Q1:  Little interest or pleasure in doing things Not at all   Q2: Feeling down, depressed or hopeless Not at all   PHQ-2 Score 0       Patient-reported           1/21/2025   Substance Use   Alcohol more than 3/day or more than 7/wk No   Do you use any other substances recreationally? No     Social History     Tobacco Use    Smoking status: Never     Passive exposure: Never    Smokeless tobacco: Never   Vaping Use    Vaping status: Never Used   Substance Use Topics    Alcohol use: Yes     Alcohol/week: 0.0 standard drinks of alcohol     Comment: 1-2 times per week (one glass of wine)    Drug use: No           11/23/2024   LAST FHS-7 RESULTS   1st degree relative breast or ovarian cancer No   Any relative bilateral breast cancer No   Any male have breast cancer No   Any ONE woman have BOTH breast AND ovarian cancer Yes   Any woman with breast cancer before 50yrs No   2 or more relatives with breast AND/OR ovarian cancer No   2 or more relatives with breast AND/OR bowel cancer No        Mammogram Screening - Mammogram every 1-2 years updated in Health Maintenance based on mutual decision making        1/21/2025   STI Screening   New sexual partner(s) since last STI/HIV test? No     History of abnormal Pap smear: No - age 30-64 HPV with reflex Pap every 5 years recommended        Latest Ref Rng & Units 7/11/2022     9:26 AM 3/10/2020     9:15 AM 3/10/2020     9:13 AM   PAP / HPV   PAP  Negative for Intraepithelial Lesion or Malignancy (NILM)      PAP (Historical)    LSIL    HPV 16 DNA Negative Negative  Negative     HPV 18 DNA Negative Positive  Negative     Other HR HPV Negative Negative  Positive       ASCVD Risk   The 10-year ASCVD risk score (Evelyn BURRIS, et al., 2019) is: 1.9%    Values used to calculate the score:      Age: 54 years      Sex: Female      Is Non- : No      Diabetic: No      Tobacco smoker: No      Systolic Blood Pressure: 152 mmHg      Is BP treated: No      HDL  "Cholesterol: 89 mg/dL      Total Cholesterol: 211 mg/dL         Reviewed and updated as needed this visit by Provider                    Past Medical History:   Diagnosis Date    Abnormal Pap smear of cervix 03/10/2020    see problem list    Cancer (H)     thyroid cancer- age 40 s/p partial thyroidectomy    High risk HPV infection 10/23/14, 3/10/20    Not HPV 16 or 18    PONV (postoperative nausea and vomiting)     Thyroid disease     hx thyroid cancer     Past Surgical History:   Procedure Laterality Date    BIOPSY  2012    Neck     SECTION      ENT SURGERY  2009    partial thyroidectomy    LAPAROSCOPIC HYSTERECTOMY TOTAL, SALPINGECTOMY BILATERAL Bilateral 2020    Procedure: TOTAL LAPAROSCOPIC HYSTERECTOMY, BILATERAL SALPINGECTOMY;  Surgeon: Natasha Don MD;  Location:  OR         Review of Systems  CONSTITUTIONAL: NEGATIVE for fever, chills, change in weight  INTEGUMENTARY/SKIN: NEGATIVE for worrisome rashes, moles or lesions  EYES: NEGATIVE for vision changes or irritation  ENT/MOUTH: NEGATIVE for ear, mouth and throat problems  RESP: NEGATIVE for significant cough or SOB  BREAST: NEGATIVE for masses, tenderness or discharge  CV: NEGATIVE for chest pain, palpitations or peripheral edema  GI: NEGATIVE for nausea, abdominal pain, heartburn, or change in bowel habits  : NEGATIVE for frequency, dysuria, or hematuria  MUSCULOSKELETAL: NEGATIVE for significant arthralgias or myalgia  NEURO: NEGATIVE for weakness, dizziness or paresthesias  ENDOCRINE: NEGATIVE for temperature intolerance, skin/hair changes  HEME: NEGATIVE for bleeding problems  PSYCHIATRIC: NEGATIVE for changes in mood or affect     Objective    Exam  BP (!) 152/92 (Cuff Size: Adult Regular)   Pulse (!) 20   Temp 97.5  F (36.4  C)   Resp 12   Ht 1.6 m (5' 3\")   Wt 57.6 kg (127 lb)   LMP 2020   SpO2 100%   BMI 22.50 kg/m     Estimated body mass index is 22.5 kg/m  as calculated from the following:    Height as " "of this encounter: 1.6 m (5' 3\").    Weight as of this encounter: 57.6 kg (127 lb).    Physical Exam  GENERAL: alert and no distress  EYES: Eyes grossly normal to inspection, PERRL and conjunctivae and sclerae normal  HENT: ear canals and TM's normal, nose and mouth without ulcers or lesions  NECK: no adenopathy, no asymmetry, masses, or scars  RESP: lungs clear to auscultation - no rales, rhonchi or wheezes  CV: regular rate and rhythm, normal S1 S2, no S3 or S4, no murmur, click or rub, no peripheral edema  ABDOMEN: soft, nontender, no hepatosplenomegaly, no masses and bowel sounds normal  MS: no gross musculoskeletal defects noted, no edema  SKIN: no suspicious lesions or rashes  NEURO: Normal strength and tone, mentation intact and speech normal  PSYCH: mentation appears normal, affect normal/bright  Atrophic vaginitis , vaginal sample pap obtained and send .      Signed Electronically by: Abbi Bartlett MD    "

## 2025-01-22 LAB
ANION GAP SERPL CALCULATED.3IONS-SCNC: 10 MMOL/L (ref 7–15)
BUN SERPL-MCNC: 14.1 MG/DL (ref 6–20)
CALCIUM SERPL-MCNC: 9.3 MG/DL (ref 8.8–10.4)
CHLORIDE SERPL-SCNC: 102 MMOL/L (ref 98–107)
CHOLEST SERPL-MCNC: 210 MG/DL
CREAT SERPL-MCNC: 0.89 MG/DL (ref 0.51–0.95)
EGFRCR SERPLBLD CKD-EPI 2021: 76 ML/MIN/1.73M2
FASTING STATUS PATIENT QL REPORTED: YES
FASTING STATUS PATIENT QL REPORTED: YES
GLUCOSE SERPL-MCNC: 82 MG/DL (ref 70–99)
HCO3 SERPL-SCNC: 26 MMOL/L (ref 22–29)
HDLC SERPL-MCNC: 89 MG/DL
HPV HR 12 DNA CVX QL NAA+PROBE: NEGATIVE
HPV16 DNA CVX QL NAA+PROBE: NEGATIVE
HPV18 DNA CVX QL NAA+PROBE: NEGATIVE
HUMAN PAPILLOMA VIRUS FINAL DIAGNOSIS: NORMAL
LDLC SERPL CALC-MCNC: 113 MG/DL
NONHDLC SERPL-MCNC: 121 MG/DL
POTASSIUM SERPL-SCNC: 4.1 MMOL/L (ref 3.4–5.3)
SODIUM SERPL-SCNC: 138 MMOL/L (ref 135–145)
TRIGL SERPL-MCNC: 41 MG/DL
TSH SERPL DL<=0.005 MIU/L-ACNC: 1.16 UIU/ML (ref 0.3–4.2)

## 2025-01-26 DIAGNOSIS — E89.0 POSTOPERATIVE HYPOTHYROIDISM: ICD-10-CM

## 2025-01-26 RX ORDER — LEVOTHYROXINE SODIUM 75 UG/1
75 TABLET ORAL DAILY
Qty: 90 TABLET | Refills: 1 | Status: SHIPPED | OUTPATIENT
Start: 2025-01-26

## 2025-01-27 ENCOUNTER — PATIENT OUTREACH (OUTPATIENT)
Dept: FAMILY MEDICINE | Facility: CLINIC | Age: 55
End: 2025-01-27
Payer: COMMERCIAL

## 2025-01-27 DIAGNOSIS — D06.9 SEVERE DYSPLASIA OF CERVIX (CIN III): Primary | ICD-10-CM

## 2025-02-06 ENCOUNTER — HOSPITAL ENCOUNTER (EMERGENCY)
Facility: CLINIC | Age: 55
Discharge: HOME | End: 2025-02-06
Attending: SOCIAL WORKER | Admitting: SOCIAL WORKER
Payer: COMMERCIAL

## 2025-02-06 ENCOUNTER — OFFICE VISIT (OUTPATIENT)
Dept: FAMILY MEDICINE | Facility: CLINIC | Age: 55
End: 2025-02-06
Payer: COMMERCIAL

## 2025-02-06 VITALS
TEMPERATURE: 97.1 F | RESPIRATION RATE: 14 BRPM | SYSTOLIC BLOOD PRESSURE: 156 MMHG | OXYGEN SATURATION: 99 % | WEIGHT: 131.61 LBS | HEART RATE: 51 BPM | DIASTOLIC BLOOD PRESSURE: 77 MMHG | BODY MASS INDEX: 23.32 KG/M2 | HEIGHT: 63 IN

## 2025-02-06 VITALS
DIASTOLIC BLOOD PRESSURE: 82 MMHG | HEIGHT: 63 IN | WEIGHT: 127.9 LBS | BODY MASS INDEX: 22.66 KG/M2 | TEMPERATURE: 97.1 F | OXYGEN SATURATION: 100 % | HEART RATE: 50 BPM | RESPIRATION RATE: 16 BRPM | SYSTOLIC BLOOD PRESSURE: 136 MMHG

## 2025-02-06 DIAGNOSIS — R93.89 ABNORMAL CT SCAN: ICD-10-CM

## 2025-02-06 DIAGNOSIS — L02.211 ABDOMINAL WALL ABSCESS: Primary | ICD-10-CM

## 2025-02-06 DIAGNOSIS — L02.211 ABSCESS OF ABDOMINAL WALL: ICD-10-CM

## 2025-02-06 LAB — RADIOLOGIST FLAGS: ABNORMAL

## 2025-02-06 PROCEDURE — 250N000013 HC RX MED GY IP 250 OP 250 PS 637: Performed by: SOCIAL WORKER

## 2025-02-06 PROCEDURE — 99283 EMERGENCY DEPT VISIT LOW MDM: CPT

## 2025-02-06 PROCEDURE — 250N000009 HC RX 250: Performed by: SOCIAL WORKER

## 2025-02-06 PROCEDURE — 10060 I&D ABSCESS SIMPLE/SINGLE: CPT

## 2025-02-06 RX ORDER — ACETAMINOPHEN 500 MG
1000 TABLET ORAL ONCE
Status: COMPLETED | OUTPATIENT
Start: 2025-02-06 | End: 2025-02-06

## 2025-02-06 RX ORDER — LIDOCAINE HYDROCHLORIDE AND EPINEPHRINE 10; 10 MG/ML; UG/ML
INJECTION, SOLUTION INFILTRATION; PERINEURAL
Status: DISCONTINUED
Start: 2025-02-06 | End: 2025-02-06 | Stop reason: HOSPADM

## 2025-02-06 RX ADMIN — ACETAMINOPHEN 1000 MG: 500 TABLET, FILM COATED ORAL at 14:23

## 2025-02-06 RX ADMIN — Medication 3 ML: at 12:31

## 2025-02-06 ASSESSMENT — COLUMBIA-SUICIDE SEVERITY RATING SCALE - C-SSRS
1. IN THE PAST MONTH, HAVE YOU WISHED YOU WERE DEAD OR WISHED YOU COULD GO TO SLEEP AND NOT WAKE UP?: NO
6. HAVE YOU EVER DONE ANYTHING, STARTED TO DO ANYTHING, OR PREPARED TO DO ANYTHING TO END YOUR LIFE?: NO
2. HAVE YOU ACTUALLY HAD ANY THOUGHTS OF KILLING YOURSELF IN THE PAST MONTH?: NO

## 2025-02-06 ASSESSMENT — ACTIVITIES OF DAILY LIVING (ADL)
ADLS_ACUITY_SCORE: 46

## 2025-02-06 NOTE — ED TRIAGE NOTES
Patient ambulatory reporting need to have an abscess in her stomach drained. Patient was seen at  this AM and sent here for further eval.

## 2025-02-06 NOTE — ED PROVIDER NOTES
Emergency Department Note      History of Present Illness     Chief Complaint   Wound Infection      HPI   Gale Willett is a 54 year old female with a history of thyroid cancer who presents to the ED for a wound infection. The patient reports that she noticed a lump to her umbilical area over the past week which is now seem to become more red.  She has not had any fevers, chills, flulike symptoms nausea or vomiting.  Went to see her primary care provider today and had a CT scan done which showed an abscess.    Independent Historian   None    Review of External Notes   I reviewed 25 family practice note for abdominal wall abscess.     IMPRESSION:   1.  Peripherally enhancing fluid collection of the periumbilical soft tissues compatible with a soft tissue abscess.  2.  Prominent ill-defined retroperitoneal soft tissue about the abdominal aorta. This is nonspecific and may represent prominent ill-defined stefanie tissue with other etiologies such as retroperitoneal fibrosis not excluded. Recommend a follow-up   contrast-enhanced CT of the abdomen and pelvis in 3 months to assess for change.    The patient had CT imaging of abdomen pelvis with contrast.  I reviewed the office visit from 24: HR was documented as 20 which I suspect is erroneous; HR in Oct 2024 was 48     Past Medical History     Medical History and Problem List   Postoperative hypothyroidism   Thyroid cancer  Vitamin D insufficiency  Severe dysplasia of cervix (SOPHIA III)  Medial meniscus tear    Medications   Cmpd rx  Synthroid/Levothroid    Surgical History   Past Surgical History:   Procedure Laterality Date    BIOPSY      Neck     SECTION      ENT SURGERY  2009    partial thyroidectomy    LAPAROSCOPIC HYSTERECTOMY TOTAL, SALPINGECTOMY BILATERAL Bilateral 2020    Procedure: TOTAL LAPAROSCOPIC HYSTERECTOMY, BILATERAL SALPINGECTOMY;  Surgeon: Natasha Don MD;  Location:  OR       Physical Exam     Patient Vitals  "for the past 24 hrs:   BP Temp Pulse Resp SpO2 Height Weight   02/06/25 1057 (!) 156/77 97.1  F (36.2  C) 51 14 99 % 1.6 m (5' 3\") 59.7 kg (131 lb 9.8 oz)     Physical Exam  General: Overall stable and nontoxic appearing  HEENT: Conjunctivae clear, no scleral icterus, mucous membranes moist  Neuro: Alert, moving all extremities equally with intention  CV: Regular rate and rhythm, radial and DP pulses equal  Respiratory: No signs of respiratory distress, lungs clear to auscultation bilaterally   Abdomen: Approximately 2cm x 1.5cm area of swelling just lateral and superior to the umbilicus with erythema. Tender to palpation. No erythema beyond the swelling area.     Diagnostics     Lab Results   Labs Ordered and Resulted from Time of ED Arrival to Time of ED Departure - No data to display    Imaging   No orders to display     Independent Interpretation   None    ED Course      Medications Administered   Medications   lidocaine 1% with EPINEPHrine 1:100,000 1 %-1:634326 injection (has no administration in time range)   lido-EPINEPHrine-tetracaine (LET) topical gel GEL (3 mLs Topical $Given 2/6/25 1231)   acetaminophen (TYLENOL) tablet 1,000 mg (1,000 mg Oral $Given 2/6/25 1423)       Procedures     Incision and Drainage     Procedure: Incision and Drainage     Consent: Verbal    Indication: Abscess    Location:  Abdomen    Size: 2 cm    Ultrasound Guidance: No    Preparation: Chlorhexidine     Anesthesia/Sedation: Topical -LET and Lidocaine with Epinephrine - 1%     Procedure Detail:    Aspiration: Yes  Incision Type: Single straight  Scalpel: 11  Lesion Management: Probed and deloculated   Wound Management: Left open   Packing: None     Patient Status: The patient tolerated the procedure well: Yes. There were no complications.      Discussion of Management   Surgery, Dr. Cronin    ED Course   ED Course as of 02/06/25 1426   Thu Feb 06, 2025   1157 Spoke with Dr. Cronin, Gen Surg   1205 I obtained history and examined " the patient as noted above.         Additional Documentation  None    Medical Decision Making / Diagnosis     CMS Diagnoses: None    MIPS       None    MDM   Gale Willett is a 54 year old female not on any immunosuppression who presents to the emergency department with a chief complaint of swelling around her umbilicus and CT scan done outpatient earlier today with evidence of abscess in the soft tissues.  Did speak with Dr. Cronin of general surgery who also reviewed the images and felt that this would be amenable to drainage in the emergency department.  Ultrasound used to locate good incision area.  I&D performed as above, purulent fluid was expressed.  Given the small size, no packing indicated.  Will leave the wound open.  Discussed wound care with patient when she goes home.  She has no fever or other systemic symptoms to indicate the need for PO antibiotics. Discussed close follow up with primary are provider and strict return precautions. Information for general surgery given. Patient verbalized understanding and is comfortable with discharge.     Disposition   The patient was discharged.     Diagnosis     ICD-10-CM    1. Abscess of abdominal wall  L02.211            Discharge Medications   New Prescriptions    No medications on file         Scribe Disclosure:  Asha ARMIJO, am serving as a scribe at 12:26 PM on 2/6/2025 to document services personally performed by Henna Chandra MD based on my observations and the provider's statements to me.        Henna Chandra MD  02/07/25 0812

## 2025-02-06 NOTE — PROGRESS NOTES
"  Assessment & Plan     (L02.211) Abdominal wall abscess  (primary encounter diagnosis)  Comment:   Plan: CT Abdomen Pelvis w Contrast, CT Abdomen Pelvis        w Contrast, CANCELED: CT Abdomen Pelvis w         Contrast            Mention mention she did an exercise class ,  2 bags of 40  lb salt ,   Noticed a bulge around umbilicus after that .Now red and painful .    Lump is red and painful .  CT  scan ruled out the umbilical hernia ,   Spoken to radiologist concerning for Soft tissue abscess .   Discussed with Surgeon Dr Cronin recommend ER visit for incision and drainage .      Christopher Beasley is a 54 year old, presenting for the following health issues:  Abdominal Pain        2/6/2025     7:50 AM   Additional Questions   Roomed by Nikkie     History of Present Illness       Reason for visit:  Pain in abdomen near belly botton  Symptom onset:  3-7 days ago  Symptoms include:  Lump or herbia by velly button. Tender to the touch. Redening and swelling around belly button  Symptom intensity:  Moderate  Symptom progression:  Worsening  Had these symptoms before:  No  What makes it worse:  Straining   She is taking medications regularly.             Review of Systems  CONSTITUTIONAL: NEGATIVE for fever, chills, change in weight  ENT/MOUTH: NEGATIVE for ear, mouth and throat problems  RESP: NEGATIVE for significant cough or SOB  CV: NEGATIVE for chest pain, palpitations or peripheral edema  Skin redness , pain full around umbilicus .      Objective    /82   Pulse 50   Temp 97.1  F (36.2  C) (Tympanic)   Resp 16   Ht 1.6 m (5' 3\")   Wt 58 kg (127 lb 14.4 oz)   LMP 08/23/2020   SpO2 100%   BMI 22.66 kg/m    Body mass index is 22.66 kg/m .  Physical Exam  Cardiovascular:      Rate and Rhythm: Normal rate.      Pulses: Normal pulses.   Pulmonary:      Effort: Pulmonary effort is normal.   Abdominal:      General: Abdomen is flat. There is no distension.      Palpations: There is mass.      Tenderness: " There is abdominal tenderness.      Comments: Lump around umbilicus .   Musculoskeletal:         General: Normal range of motion.   Skin:     General: Skin is warm.      Findings: Erythema and rash present.   Neurological:      General: No focal deficit present.   Psychiatric:         Mood and Affect: Mood normal.            Signed Electronically by: Abbi Bartlett MD

## 2025-02-06 NOTE — DISCHARGE INSTRUCTIONS
You were seen in the emergency department for an abscess right at the umbilical area.  We were able to drain this here in the emergency department.  As you is otherwise feeling well and do not have any other signs of infection, at this time antibiotics are not indicated.    At home, you can let water and soap run over the area does not scrub at it.  Try using warm compresses as well.  Take Tylenol ibuprofen for pain as needed.    Please call your primary care doctor to schedule follow-up appointment for checkup of the area in the next 2 to 3 days. I have also given you the phone number for the general surgeons he can follow-up with next week as well if it does not get better.  Come back to the emergency department if you start to have fever, if there is severely worsening pain at that location, if there is redness that is spreading in the area, if you have chills, flulike symptoms, nausea vomiting, or other concerning symptoms.

## 2025-02-10 ENCOUNTER — MYC MEDICAL ADVICE (OUTPATIENT)
Dept: FAMILY MEDICINE | Facility: CLINIC | Age: 55
End: 2025-02-10
Payer: COMMERCIAL

## 2025-02-10 ENCOUNTER — NURSE TRIAGE (OUTPATIENT)
Dept: FAMILY MEDICINE | Facility: CLINIC | Age: 55
End: 2025-02-10
Payer: COMMERCIAL

## 2025-02-10 NOTE — TELEPHONE ENCOUNTER
Please also see My chart message. Writer called patient, gave her Dr. Bartlett recommendation for ER visit. Patient is in agreement.

## 2025-02-10 NOTE — TELEPHONE ENCOUNTER
"Nurse Triage SBAR    Is this a 2nd Level Triage? YES, LICENSED PRACTITIONER REVIEW IS REQUIRED    Situation: pt calls was seen last week by PCP and ED for abscess wound. Wound was Incised and drained. See ED notes. Patient is worried infection is getting worse.    Background: patient states her umbilical area is very red, she states her \" belly button went from an inni to an outie.\"Area is painful to touch. No fever. It is now draining pus from the incisional cite.   She is taking Doxycycline. No fever.   Patient states she is going to send some pictures via my chart  Assessment: abcessed wound.     Protocol Recommended Disposition:   Go To ED/UCC Now (Or To Office With PCP Approval)    Recommendation: patient would prefer not to have to return to ED, is wanting provider input     Routed to provider    Does the patient meet one of the following criteria for ADS visit consideration? 16+ years old, with an [facility] PCP     TIP  Providers, please consider if this condition is appropriate for management at one of our Acute and Diagnostic Services sites.     If patient is a good candidate, please use dotphrase <dot>triageresponse and select Refer to ADS to document.    Reason for Disposition   Bright red, wide-spread, sunburn-like rash    Additional Information   Negative: Stitches and not infected   Negative: Surgical wound infection suspected (post-op)    Answer Assessment - Initial Assessment Questions  1. LOCATION: \"Where is the wound located?\"       The umbilical area  2. WOUND APPEARANCE: \"What does the wound look like?\"       Lump is a little smaller than a golf ball, she thinks it may be worse.   3. SIZE: If redness is present, ask: \"What is the size of the red area?\" (Inches, centimeters, or compare to size of a coin)       Yes, is all around the umbilical area  4. SPREAD: \"What's changed in the last day?\"  \"Do you see any red streaks coming from the wound?\"      Area is red and inflamed  5. ONSET: \"When did it " "start to look infected?\"       Day after incision in ED  6. MECHANISM: \"How did the wound start, what was the cause?\"      Was incised at ED, area has been on going for infection for a week  7. PAIN: \"Is there any pain?\" If Yes, ask: \"How bad is the pain?\"   (Scale 1-10; or mild, moderate, severe)      Yeah, when touching is worse, not as bad as it was  8. FEVER: \"Do you have a fever?\" If Yes, ask: \"What is your temperature, how was it measured, and when did it start?\"      No fever  9. OTHER SYMPTOMS: \"Do you have any other symptoms?\" (e.g., shaking chills, weakness, rash elsewhere on body)      Around the red area she is starting to itch, around the bandage is starting to itch.  10. PREGNANCY: \"Is there any chance you are pregnant?\" \"When was your last menstrual period?\"        N/a    Protocols used: Wound Infection-A-[place]    "

## 2025-02-10 NOTE — TELEPHONE ENCOUNTER
Writer spoke with Dr. Bartlett who recommends the patient call Same Day surgery to see if she can be seen earlier than Wednesday or to return to the ER.  Writer called patient. Left Voicemail to call clinic.

## 2025-02-12 ENCOUNTER — OFFICE VISIT (OUTPATIENT)
Dept: SURGERY | Facility: CLINIC | Age: 55
End: 2025-02-12
Payer: COMMERCIAL

## 2025-02-12 ENCOUNTER — LAB (OUTPATIENT)
Dept: LAB | Facility: CLINIC | Age: 55
End: 2025-02-12
Payer: COMMERCIAL

## 2025-02-12 VITALS
HEART RATE: 47 BPM | RESPIRATION RATE: 16 BRPM | SYSTOLIC BLOOD PRESSURE: 112 MMHG | OXYGEN SATURATION: 100 % | DIASTOLIC BLOOD PRESSURE: 74 MMHG | BODY MASS INDEX: 23.21 KG/M2 | HEIGHT: 63 IN | WEIGHT: 131 LBS

## 2025-02-12 DIAGNOSIS — L02.211 CUTANEOUS ABSCESS OF ABDOMINAL WALL: ICD-10-CM

## 2025-02-12 PROCEDURE — 99203 OFFICE O/P NEW LOW 30 MIN: CPT | Mod: 25 | Performed by: STUDENT IN AN ORGANIZED HEALTH CARE EDUCATION/TRAINING PROGRAM

## 2025-02-12 PROCEDURE — 87070 CULTURE OTHR SPECIMN AEROBIC: CPT

## 2025-02-12 PROCEDURE — 11402 EXC TR-EXT B9+MARG 1.1-2 CM: CPT | Performed by: STUDENT IN AN ORGANIZED HEALTH CARE EDUCATION/TRAINING PROGRAM

## 2025-02-12 RX ORDER — DOXYCYCLINE 100 MG/1
100 CAPSULE ORAL 2 TIMES DAILY
Qty: 14 CAPSULE | Refills: 0 | Status: SHIPPED | OUTPATIENT
Start: 2025-02-12 | End: 2025-02-12

## 2025-02-12 RX ORDER — DOXYCYCLINE 100 MG/1
100 CAPSULE ORAL 2 TIMES DAILY
Qty: 14 CAPSULE | Refills: 0 | Status: SHIPPED | OUTPATIENT
Start: 2025-02-12

## 2025-02-12 NOTE — PROCEDURES
[unfilled]    Incision and drainage of Umbilicus    Date/Time: 2/12/2025 10:22 AM    Performed by: Georgia Quick MD  Authorized by: Georgia Quick MD      UNIVERSAL PROTOCOL   Site Marked: Yes  Prior Images Obtained and Reviewed:  Yes  Required items: Required blood products, implants, devices and special equipment available    Patient identity confirmed:  Verbally with patient  NA - No sedation, light sedation, or local anesthesia  Confirmation Checklist:  Patient's identity using two indicators, relevant allergies and procedure was appropriate and matched the consent or emergent situation  Time out: Immediately prior to the procedure a time out was called    Universal Protocol: the Joint Commission Universal Protocol was followed    Preparation: Patient was prepped and draped in usual sterile fashion    ESBL (mL):  10    ANESTHESIA  local infiltration  Local anesthesia used: yes  Local Anesthetic: lidocaine 1% with epinephrine  Anesthetic total: 20 mL      SEDATION    Patient Sedated: No      Type: abscess  Body area: trunk  Location details: abdomen  Scalpel size: 11  Incision type: elliptical  Incision depth: dermal  Complexity: simple  Drainage: purulent  Drainage amount: moderate  Wound treatment: wound left open  Packing material: 1/2 in iodoform gauze      PROCEDURE  Describe Procedure: Culture taken of draining purulence. Excision of 1 cm circumference of necrotic umbilical skin. Necrotic fat removed and wound irrigated. Wound roughly 2x2x2 cm. Packed with 1/2 inch gauze  Patient Tolerance:  Patient tolerated the procedure well with no immediate complications  Length of time physician/provider present for 1:1 monitoring during sedation: 0

## 2025-02-12 NOTE — PATIENT INSTRUCTIONS
HOME CARE FOLLOWING DEBRIDEMENT  CLINTON Sanchez, SUSANA Larose, ORLANDO Newby    WOUND CARE:  Dressing changes should be done one or two times a day as recommended by your surgeon.  Dressing change of wet-to-dry dressin. Remove outer layer of dressing material  2. Moisten the packing in the wound, either with saline or in the shower (it is ok if soap gets into the packing/wound area). 3. After the packing is adequately moistened, slowly remove the packing material and rinse the wound with saline or allow water from your shower rinse the wound by allowing it to run down into the site (NOT directly hitting the site).  4. Once the wound bed is cleansed, pat the area dry.  5. Repack the wound with saline-moistened gauze or recommended packing material.  Try not to allow the moistened gauze to contact your normal skin outside of the wound.  6. Apply over-lay absorbant dry gauze and tape in place.   If you have any questions or concerns about your wound or wound care, or would like further instruction, please contact your surgeon's office.  If you have a complicated wound and have been instructed by a Specialized Wound Care Nurse during your hospitalization, and they have given you contact information to reach them, you may also contact them.    ACTIVITY:  Minimize lifting and stretching of area of debridement and the closest extremity (arm or leg) until further recommended by your surgeon.  If your surgery site is in the abdomen, restrict from overhead reaching and/or stretching.    DIET:  No restrictions.  Increased fluid intake is recommended. While taking pain medications, increase dietary fiber or add a fiber supplementation like Metamucil or Citrucel to help prevent constipation - a possible side effect of pain medications.    DISCOMFORT:  Begin taking pain pills before discomfort is severe.  Take the pain medication with some food, when possible, to minimize side effects.   Intermittent use of ice packs may help.  Expect gradual improvement.    RETURN APPOINTMENT:  As recommended by the surgeon.   Office Phone:  [phone]     CONTACT US IF THE FOLLOWING DEVELOPS:   1. A fever that is above 101     2. If there is a large amount of drainage, bleeding, or swelling.   3. Severe pain that is not relieved by your prescription.   4. Drainage that is thick, cloudy, yellow, green or white.   5. Any other questions not answered by  Frequently Asked Questions  sheet.        FREQUENTLY ASKED QUESTIONS:    Q:  How should my incision look?    A:  Normally your incision will appear slightly swollen with light redness directly along the incision itself as it heals.  It may feel like a bump or ridge as the healing/scarring happens, and over time (3-4 months) this bump or ridge feeling should slowly go away.  In general, clear or pink watery drainage can be normal at first as your incision heals, but should decrease over time.    Q:  How do I know if my incision is infected?  A:  Look at your incision for signs of infection, like redness around the incision spreading to surrounding skin, or drainage of cloudy or foul-smelling drainage.  If you feel warm, check your temperature to see if you are running a fever.    **If any of these things occur, please notify the nurse at our office.  We may need you to come into the office for an incision check.      Q:  How do I take care of my incision?  A:  If you have a dressing in place - Starting the day after surgery, replace the dressing 1-2 times a day until there is no further drainage from the incision.  At that time, a dressing is no longer needed.  Try to minimize tape on the skin if irritation is occurring at the tape sites.  If you have significant irritation from tape on the skin, please call the office to discuss other method of dressing your incision.    Small pieces of tape called  steri-strips  may be present directly overlying your incision;  these may be removed 10 days after surgery unless otherwise specified by your surgeon.  If these tapes start to loosen at the ends, you may trim them back until they fall off or are removed.    A:  If you had  Dermabond  tissue glue used as a dressing (this causes your incision to look shiny with a clear covering over it) - This type of dressing wears off with time and does not require more dressings over the top unless it is draining around the glue as it wears off.  Do not apply ointments or lotions over the incisions until the glue has completely worn off.    Q:  There is a piece of tape or a sticky  lead  still on my skin.  Can I remove this?  A:  Sometimes the sticky  leads  used for monitoring during surgery or for evaluation in the emergency department are not all removed while you are in the hospital.  These sometimes have a tab or metal dot on them.  You can easily remove these on your own, like taking off a band-aid.  If there is a gel substance under the  lead , simply wipe/clean it off with a washcloth or paper towel.      Q:  What can I do to minimize constipation (very hard stools, or lack of stools)?  A:  Stay well hydrated.  Increase your dietary fiber intake or take a fiber supplement -with plenty of water.  Walk around frequently.  You may consider an over-the-counter stool-softener.  Your Pharmacist can assist you with choosing one that is stocked at your pharmacy.  Constipation is also one of the most common side effects of pain medication.  If you are using pain medication, be pro-active and try to PREVENT problems with constipation by taking the steps above BEFORE constipation becomes a problem.    Q:  What do I do if I need more pain medications?  A:  Call the office to receive refills.  Be aware that certain pain meds cannot be called into a pharmacy and actually require a paper prescription.  A change may be made in your pain med as you progress thru your recovery period or if you have side  effects to certain meds.    --Pain meds are NOT refilled after 5pm on weekdays, and NOT AT ALL on the weekends, so please look ahead to prevent problems.      Q:  Why am I having a hard time sleeping now that I am at home?  A:  Many medications you receive while you are in the hospital can impact your sleep for a number of days after your surgery/hospitalization.  Decreased level of activity and naps during the day may also make sleeping at night difficult.  Try to minimize day-time naps, and get up frequently during the day to walk around your home during your recovery time.  Sleep aides may be of some help, but are not recommended for long-term use.      Q:  I am having some back discomfort.  What should I do?  A:  This may be related to certain positioning that was required for your surgery, extended periods of time in bed, or other changes in your overall activity level.  You may try ice, heat, acetaminophen, or ibuprofen to treat this temporarily.  Note that many pain medications have acetaminophen in them and would state this on the prescription bottle.  Be sure not to exceed the maximum of 4000mg per day of acetaminophen.     **If the pain you are having does not resolve, is severe, or is a flare of back pain you have had on other occasions prior to surgery, please contact your primary physician for further recommendations or for an appointment to be examined at their office.    Q:  Why am I having headaches?  A:  Headaches can be caused by many things:  caffeine withdrawal, use of pain meds, dehydration, high blood pressure, lack of sleep, over-activity/exhaustion, flare-up of usual migraine headaches.  If you feel this is related to muscle tension (a band-like feeling around the head, or a pressure at the low-back of the head) you may try ice or heat to this area.  You may need to drink more fluids (try electrolyte drink like Gatorade), rest, or take your usual migraine medications.   **If your headaches do  not resolve, worsen, are accompanied by other symptoms, or if your blood pressure is high, please call your primary physician for recommendation and/or examination.    Q:  I am unable to urinate.  What do I do?  A:  A small percentage of people can have difficulty urinating initially after surgery.  This includes being able to urinate only a very small amount at a time and feeling discomfort or pressure in the very low abdomen.  This is called  urinary retention , and is actually an urgent situation.  Proceed to your nearest Emergency department for evaluation (not an Urgent Care Center).  Sometimes the bladder does not work correctly after certain medications you receive during surgery, or related to certain procedures.  You may need to have a catheter placed until your bladder recovers.  When planning to go to an Emergency department, it may help to call the ER to let them know you are coming in for this problem after a surgery.  This may help you get in quicker to be evaluated.  **If you have symptoms of a urinary tract infection, please contact your primary physician for the proper evaluation and treatment.          If you have other questions, please call the office Monday thru Friday between 8am and 4:30pm to discuss with the nurse or physician assistant.  #[phone]    There is a surgeon ON CALL on weekday evenings and over the weekend in case of urgent need only, and may be contacted at the same number.    If you are having an emergency, call 911 or proceed to your nearest emergency department.

## 2025-02-12 NOTE — PROGRESS NOTES
The following medication was given:     MEDICATION: Lidocaine HCL 1% and epinephrine 1:100,000 injection  LOT #: [id number]  NDC #:  19781-819-17  :  [employer] [employer]  EXPIRATION DATE:  06/2026

## 2025-02-12 NOTE — PROGRESS NOTES
[facility] GENERAL SURGERY CONSULTATION    Assessment and Plan:    Gale Willett is a 54 year old female with history of hysterectomy and abdominoplasty who presents with an umbilical abscess for a week and a half.      Wound had continued drainage and necrotic skin so I counseled the patient that she would be best treated with I and D in clinic today. Patient agreed.    Plan to return in 2 weeks for a wound check and restart doxycycline for another 7 day course   Patient to do packing twice daily tomorrow then transition to once a day.    Purulence sent for culture.       Chief complaint:  Abdominal infection    HPI:  Gale Willett is a 54 year old female who presents after an I and D performed in the ED of her umbilicus on . Patient had initially some drainage. She started antibiotics last week and saw some improvement in erythema around the wound but it continued to have purulent drainage and pain.        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    Past Surgical History:  Past Surgical History:   Procedure Laterality Date    BIOPSY  2012    Neck     SECTION      ENT SURGERY  2009    partial thyroidectomy    LAPAROSCOPIC HYSTERECTOMY TOTAL, SALPINGECTOMY BILATERAL Bilateral 2020    Procedure: TOTAL LAPAROSCOPIC HYSTERECTOMY, BILATERAL SALPINGECTOMY;  Surgeon: Natasha Don MD;  Location:  OR       Social History:  Social History     Socioeconomic History    Marital status:      Spouse name: Not on file    Number of children: Not on file    Years of education: Not on file    Highest education level: Not on file   Occupational History    Not on file   Tobacco Use    Smoking status: Never     Passive exposure: Never    Smokeless tobacco: Never   Vaping Use    Vaping status: Never Used   Substance and Sexual  Activity    Alcohol use: Yes     Alcohol/week: 0.0 standard drinks of alcohol     Comment: 1-2 times per week (one glass of wine)    Drug use: No    Sexual activity: Yes     Partners: Male     Birth control/protection: Female Surgical     Comment: ; twins children   Other Topics Concern    Parent/sibling w/ CABG, MI or angioplasty before 65F 55M? No   Social History Narrative    Not on file     Social Drivers of Health     Financial Resource Strain: Low Risk  (1/21/2025)    Financial Resource Strain     Within the past 12 months, have you or your family members you live with been unable to get utilities (heat, electricity) when it was really needed?: No   Food Insecurity: Low Risk  (1/21/2025)    Food Insecurity     Within the past 12 months, did you worry that your food would run out before you got money to buy more?: No     Within the past 12 months, did the food you bought just not last and you didn t have money to get more?: No   Transportation Needs: Low Risk  (1/21/2025)    Transportation Needs     Within the past 12 months, has lack of transportation kept you from medical appointments, getting your medicines, non-medical meetings or appointments, work, or from getting things that you need?: No   Physical Activity: Sufficiently Active (1/21/2025)    Exercise Vital Sign     Days of Exercise per Week: 6 days     Minutes of Exercise per Session: 60 min   Stress: No Stress Concern Present (1/21/2025)    [employer] - Occupational Stress Questionnaire     Feeling of Stress : Not at all   Social Connections: Unknown (1/21/2025)    Social Connection and Isolation Panel [NHANES]     Frequency of Communication with Friends and Family: Not on file     Frequency of Social Gatherings with Friends and Family: Once a week     Attends Gnosticist Services: Not on file     Active Member of Clubs or Organizations: Not on file     Attends Club or Organization Meetings: Not on file     Marital  "Status: Not on file   Interpersonal Safety: Low Risk  (1/21/2025)    Interpersonal Safety     Do you feel physically and emotionally safe where you currently live?: Yes     Within the past 12 months, have you been hit, slapped, kicked or otherwise physically hurt by someone?: No     Within the past 12 months, have you been humiliated or emotionally abused in other ways by your partner or ex-partner?: No   Housing Stability: Low Risk  (1/21/2025)    Housing Stability     Do you have housing? : Yes     Are you worried about losing your housing?: No        Family History:  Family History   Problem Relation Age of Onset    Hypertension Mother     Alzheimer Disease Mother         under age 50    Hyperlipidemia Mother     Cerebrovascular Disease Mother     Hypertension Father     Hyperlipidemia Father     Obesity Father     Colon Cancer No family hx of      Family history reviewed and is not pertinent    Review of Systems:  The 10 point review of systems is negative other than noted in the HPI and above.    Physical Exam:  Vitals: /74   Pulse (!) 47   Resp 16   Ht 1.6 m (5' 3\")   Wt 59.4 kg (131 lb)   LMP 08/23/2020   SpO2 100%   BMI 23.21 kg/m    BMI= Body mass index is 23.21 kg/m .  General - Well developed, well nourished female in no apparent distress  HEENT:  Head normocephalic and atraumatic, pupils equal and round, conjunctivae clear, no scleral icterus, mucous membranes moist, external ears and nose normal  Pulmonary: Unlabored breathing  Abdomen:   Erythema around umbilicus with a 1 cm area of necrotic skin with draining purulence. No fascial violation felt.   Extremities: Warm without edema  Musculoskeletal:  Normal station and gait  Neurologic: alert, speech is clear, moves all extremities with good strength  Psychiatric: Mood and affect appropriate  Skin: Without lesions, rashes or juandice    Relevant labs:    WBC -   Lab Results   Component Value Date    WBC 4.1 10/19/2023       HgB -   Lab " Results   Component Value Date    HGB 14.4 10/19/2023       Plt-   Lab Results   Component Value Date     10/19/2023           Imaging:  All imaging studies reviewed by me.      Recent Results (from the past 744 hours)   CT Abdomen Pelvis w Contrast   Result Value    Radiologist flags Soft tissue abscess (Urgent)    Narrative    EXAM: CT ABDOMEN PELVIS W CONTRAST  LOCATION: [facility]  DATE: 2/6/2025    INDICATION: umblical hernia painfull , concerned for strangulation  COMPARISON: None.  TECHNIQUE: CT scan of the abdomen and pelvis was performed following injection of IV contrast. Multiplanar reformats were obtained. Dose reduction techniques were used.  CONTRAST: 64mL Isovue 370    FINDINGS:   LOWER CHEST: Normal.    HEPATOBILIARY: Normal.    PANCREAS: Normal.    SPLEEN: Normal.    ADRENAL GLANDS: Normal.    KIDNEYS/BLADDER: Normal.    BOWEL: No obstruction or inflammatory change. Normal appendix.    LYMPH NODES: Prominent ill-defined retroperitoneal soft tissue about the abdominal aorta (series 4, image 86).    VASCULATURE: Normal.    PELVIC ORGANS: Hysterectomy.    MUSCULOSKELETAL: Peripherally enhancing paraumbilical fluid collection of the ventral abdominal wall soft tissues measuring 2.3 x 1.9 x 2.5 cm. No definite umbilical hernia fascial defect identified. Partially imaged left breast implant. Facet   arthropathy of the lower lumbar spine.      Impression    IMPRESSION:   1.  Peripherally enhancing fluid collection of the periumbilical soft tissues compatible with a soft tissue abscess.  2.  Prominent ill-defined retroperitoneal soft tissue about the abdominal aorta. This is nonspecific and may represent prominent ill-defined stefanie tissue with other etiologies such as retroperitoneal fibrosis not excluded. Recommend a follow-up   contrast-enhanced CT of the abdomen and pelvis in 3 months to assess for change.      [Access Center: Soft tissue abscess]    This report will be  copied to the [facility] to ensure a provider acknowledges the finding. Access Center is available Monday through Friday 8am-3:30 pm.            This note was created using voice recognition software. Undetected word substitutions or other errors may have occurred.     Time spent with the patient with greater that 50% of the time in discussion was 30 minutes.     Georgia Quick MD  [facility] [place]    Please route or send letter to:  Primary Care Provider (PCP)

## 2025-02-13 LAB
BACTERIA ABSC ANAEROBE+AEROBE CULT: ABNORMAL
GRAM STAIN RESULT: ABNORMAL
GRAM STAIN RESULT: ABNORMAL

## 2025-02-20 ENCOUNTER — OFFICE VISIT (OUTPATIENT)
Dept: SURGERY | Facility: CLINIC | Age: 55
End: 2025-02-20
Payer: COMMERCIAL

## 2025-02-20 VITALS
HEART RATE: 43 BPM | WEIGHT: 131 LBS | BODY MASS INDEX: 23.21 KG/M2 | HEIGHT: 63 IN | DIASTOLIC BLOOD PRESSURE: 78 MMHG | OXYGEN SATURATION: 99 % | SYSTOLIC BLOOD PRESSURE: 126 MMHG

## 2025-02-20 DIAGNOSIS — L02.211 CUTANEOUS ABSCESS OF ABDOMINAL WALL: Primary | ICD-10-CM

## 2025-02-20 NOTE — PROGRESS NOTES
"[facility] Clinic Note     Subjective:  Gale Willett is here for a wound check. She underwent an incision and drainage of an abdominal wall abscess in the clinic by Dr. Quick on 2/12/24. Today she tells me she has been feeling well since the procedure. She has been packing it with iodoform gauze and covering with a Band-Aid. She has not had much drainage from it. She says it is not painful. The large \"lump\" is gone but she still notices some firmness in the tissue. We discussed that the tissue is healing and this can have some firmness to it and will take some time to soften up. She currently does not require narcotic pain medications, she is eating a normal diet. She has a few days left of her antibiotics (doxycyline).     Objective:  Abd - soft, non-tender, non-distended  Wound - healing well, scant drainage on bandage, some granulation tissue present, some fibrinous slough in the wound that was cleaned with wound spray. I placed some [place] gauze into the wound bed and covered with gauze.      Assessment:  Routine wound care s/p I&D of abdominal wall abscess    Plan:  Continue to place [place] gauze into wound bed and cover as needed  If drainage stops she can just put some bacitracin over the wound bed and cover with Band-Aid    Ramu Sanchez PA-C  2/20/2025          "

## 2025-02-28 NOTE — TELEPHONE ENCOUNTER
Patient due for Pap and HPV.    Reminder done today via telephone call.     Referral for Dr Mccoy and Lidia Saucedo placed due to left knee pain  Please obtain an x ray of the left knee    Fiordaliza Ramos NP

## (undated) DEVICE — PREP POVIDONE-IODINE 7.5% SCRUB 4OZ BOTTLE MDS093945

## (undated) DEVICE — LINEN FULL SHEET 5511

## (undated) DEVICE — PREP POVIDONE IODINE SOLUTION 10% 120ML

## (undated) DEVICE — SU VICRYL 4-0 PS-2 27" UND J426H

## (undated) DEVICE — ESU LIGASURE LAPAROSCOPIC BLUNT TIP SEALER 5MMX37CM LF1837

## (undated) DEVICE — ESU CORD MONOPOLAR 10'  E0510

## (undated) DEVICE — PACK LAP HYST RIDGES

## (undated) DEVICE — SU VICRYL 0 CT-1 36" J346H

## (undated) DEVICE — NDL INSUFFLATION 13GA 120MM C2201

## (undated) DEVICE — GLOVE PROTEXIS BLUE W/NEU-THERA 7.0  2D73EB70

## (undated) DEVICE — GLOVE PROTEXIS W/NEU-THERA 6.5  2D73TE65

## (undated) DEVICE — PAD CHUX UNDERPAD 30X36" P3036C

## (undated) DEVICE — SYR 50ML LL W/O NDL 309653

## (undated) DEVICE — SUCTION TIP YANKAUER STR K87

## (undated) DEVICE — LINEN TOWEL PACK X10 5473

## (undated) DEVICE — LINEN HALF SHEET 5512

## (undated) DEVICE — RETR ELEV / UTERINE MANIPULATOR V-CARE MED CUP 60-6085-201A

## (undated) DEVICE — ENDO SNARE POLYPECTOMY OVAL 15MM LOOP SD-240U-15

## (undated) DEVICE — BLADE KNIFE SURG 11 371111

## (undated) DEVICE — ENDO TRAP POLYP QUICK CATCH 710201

## (undated) DEVICE — KOH COLPOTOMIZER OCCLUDER  CPO-6

## (undated) DEVICE — BAG CLEAR TRASH 1.3M 39X33" P4040C

## (undated) DEVICE — ENDO TROCAR SLEEVE KII ADV FIXATION 05X100MM CFS02

## (undated) DEVICE — SURGICEL POWDER ABSORBABLE HEMOSTAT 3GM 3013SP

## (undated) DEVICE — KIT ENDO TURNOVER/PROCEDURE W/CLEAN A SCOPE LINERS 103888

## (undated) DEVICE — EVAC SYSTEM CLEAR FLOW SC082500

## (undated) DEVICE — SUCTION IRR STRYKERFLOW II W/TIP 250-070-520

## (undated) DEVICE — SUCTION TIP YANKAUER W/O VENT K86

## (undated) DEVICE — APPLICATOR ENDOSCOPIC 5 SURGICEL POWDER 3123SPEA

## (undated) DEVICE — ADH SKIN CLOSURE PREMIERPRO EXOFIN 1.0ML 3470

## (undated) DEVICE — LINEN POUCH DBL 5427

## (undated) DEVICE — TUBING SUCTION 12"X1/4" N612

## (undated) DEVICE — ENDO TROCAR FIRST ENTRY KII FIOS ADV FIX 05X100MM CFF03

## (undated) DEVICE — SOL NACL 0.9% IRRIG 3000ML BAG 2B7477

## (undated) RX ORDER — FENTANYL CITRATE 50 UG/ML
INJECTION, SOLUTION INTRAMUSCULAR; INTRAVENOUS
Status: DISPENSED
Start: 2020-09-23

## (undated) RX ORDER — CEFAZOLIN SODIUM 2 G/100ML
INJECTION, SOLUTION INTRAVENOUS
Status: DISPENSED
Start: 2020-09-23

## (undated) RX ORDER — LIDOCAINE HYDROCHLORIDE 10 MG/ML
INJECTION, SOLUTION EPIDURAL; INFILTRATION; INTRACAUDAL; PERINEURAL
Status: DISPENSED
Start: 2020-09-23

## (undated) RX ORDER — DEXAMETHASONE SODIUM PHOSPHATE 4 MG/ML
INJECTION, SOLUTION INTRA-ARTICULAR; INTRALESIONAL; INTRAMUSCULAR; INTRAVENOUS; SOFT TISSUE
Status: DISPENSED
Start: 2020-09-23

## (undated) RX ORDER — ONDANSETRON 2 MG/ML
INJECTION INTRAMUSCULAR; INTRAVENOUS
Status: DISPENSED
Start: 2020-09-23

## (undated) RX ORDER — NEOSTIGMINE METHYLSULFATE 1 MG/ML
VIAL (ML) INJECTION
Status: DISPENSED
Start: 2020-09-23

## (undated) RX ORDER — PROPOFOL 10 MG/ML
INJECTION, EMULSION INTRAVENOUS
Status: DISPENSED
Start: 2020-09-23

## (undated) RX ORDER — GLYCOPYRROLATE 0.2 MG/ML
INJECTION INTRAMUSCULAR; INTRAVENOUS
Status: DISPENSED
Start: 2020-09-23

## (undated) RX ORDER — BUPIVACAINE HYDROCHLORIDE 2.5 MG/ML
INJECTION, SOLUTION EPIDURAL; INFILTRATION; INTRACAUDAL
Status: DISPENSED
Start: 2020-09-23

## (undated) RX ORDER — EPHEDRINE SULFATE 50 MG/ML
INJECTION, SOLUTION INTRAMUSCULAR; INTRAVENOUS; SUBCUTANEOUS
Status: DISPENSED
Start: 2020-09-23

## (undated) RX ORDER — ACETAMINOPHEN 325 MG/1
TABLET ORAL
Status: DISPENSED
Start: 2020-09-23

## (undated) RX ORDER — FENTANYL CITRATE 50 UG/ML
INJECTION, SOLUTION INTRAMUSCULAR; INTRAVENOUS
Status: DISPENSED
Start: 2020-07-01

## (undated) RX ORDER — PHENAZOPYRIDINE HYDROCHLORIDE 200 MG/1
TABLET, FILM COATED ORAL
Status: DISPENSED
Start: 2020-09-23

## (undated) RX ORDER — SCOLOPAMINE TRANSDERMAL SYSTEM 1 MG/1
PATCH, EXTENDED RELEASE TRANSDERMAL
Status: DISPENSED
Start: 2020-09-23